# Patient Record
Sex: MALE | Race: WHITE | NOT HISPANIC OR LATINO | Employment: UNEMPLOYED | URBAN - METROPOLITAN AREA
[De-identification: names, ages, dates, MRNs, and addresses within clinical notes are randomized per-mention and may not be internally consistent; named-entity substitution may affect disease eponyms.]

---

## 2020-06-09 ENCOUNTER — DOCUMENTATION ONLY (OUTPATIENT)
Dept: FAMILY MEDICINE | Facility: CLINIC | Age: 35
End: 2020-06-09

## 2020-06-09 NOTE — PROGRESS NOTES
Pre-Visit Chart Review  For Appointment Scheduled on 6/10/2020    There are no preventive care reminders to display for this patient.

## 2020-06-10 ENCOUNTER — HOSPITAL ENCOUNTER (OUTPATIENT)
Dept: RADIOLOGY | Facility: CLINIC | Age: 35
Discharge: HOME | End: 2020-06-10
Attending: FAMILY MEDICINE
Payer: COMMERCIAL

## 2020-06-10 ENCOUNTER — TELEPHONE (OUTPATIENT)
Dept: FAMILY MEDICINE | Facility: CLINIC | Age: 35
End: 2020-06-10

## 2020-06-10 ENCOUNTER — OFFICE VISIT (OUTPATIENT)
Dept: FAMILY MEDICINE | Facility: CLINIC | Age: 35
End: 2020-06-10
Payer: COMMERCIAL

## 2020-06-10 VITALS
DIASTOLIC BLOOD PRESSURE: 64 MMHG | BODY MASS INDEX: 27.88 KG/M2 | HEART RATE: 83 BPM | SYSTOLIC BLOOD PRESSURE: 110 MMHG | WEIGHT: 167.31 LBS | OXYGEN SATURATION: 96 % | TEMPERATURE: 97 F | HEIGHT: 65 IN

## 2020-06-10 DIAGNOSIS — M54.50 CHRONIC MIDLINE LOW BACK PAIN WITHOUT SCIATICA: ICD-10-CM

## 2020-06-10 DIAGNOSIS — G89.29 CHRONIC MIDLINE LOW BACK PAIN WITHOUT SCIATICA: ICD-10-CM

## 2020-06-10 DIAGNOSIS — Z00.00 HEALTHCARE MAINTENANCE: Primary | ICD-10-CM

## 2020-06-10 PROCEDURE — 99999 PR PBB SHADOW E&M-EST. PATIENT-LVL IV: CPT | Mod: PBBFAC,,, | Performed by: FAMILY MEDICINE

## 2020-06-10 PROCEDURE — 99385 PREV VISIT NEW AGE 18-39: CPT | Mod: S$GLB,,, | Performed by: FAMILY MEDICINE

## 2020-06-10 PROCEDURE — 99999 PR PBB SHADOW E&M-EST. PATIENT-LVL IV: ICD-10-PCS | Mod: PBBFAC,,, | Performed by: FAMILY MEDICINE

## 2020-06-10 PROCEDURE — 72100 XR LUMBAR SPINE AP AND LATERAL: ICD-10-PCS | Mod: 26,,, | Performed by: RADIOLOGY

## 2020-06-10 PROCEDURE — 99385 PR PREVENTIVE VISIT,NEW,18-39: ICD-10-PCS | Mod: S$GLB,,, | Performed by: FAMILY MEDICINE

## 2020-06-10 PROCEDURE — 72100 X-RAY EXAM L-S SPINE 2/3 VWS: CPT | Mod: TC,FY,PO

## 2020-06-10 PROCEDURE — 72100 X-RAY EXAM L-S SPINE 2/3 VWS: CPT | Mod: 26,,, | Performed by: RADIOLOGY

## 2020-06-10 RX ORDER — PANTOPRAZOLE SODIUM 40 MG/1
40 TABLET, DELAYED RELEASE ORAL DAILY
Qty: 30 TABLET | Refills: 1 | Status: SHIPPED | OUTPATIENT
Start: 2020-06-10 | End: 2020-11-23

## 2020-06-10 RX ORDER — CYCLOBENZAPRINE HCL 5 MG
5 TABLET ORAL 3 TIMES DAILY PRN
Qty: 90 TABLET | Refills: 1 | Status: SHIPPED | OUTPATIENT
Start: 2020-06-10 | End: 2020-07-10

## 2020-06-10 NOTE — TELEPHONE ENCOUNTER
Called pharmacy. Medication is ready to , called patient let him know, and advise he needs to bring hi insurance card,. Understand and verbalized

## 2020-06-10 NOTE — PROGRESS NOTES
Subjective:   Patient ID: Hero Salmeron is a 34 y.o. male     Chief Complaint:Establish Care      Patient for checkup.  Patient doing well.  Patient with lower back pain been going on for past years.    Review of Systems   Constitutional: Negative for chills and fever.   HENT: Negative for sore throat and trouble swallowing.    Respiratory: Negative for cough and shortness of breath.    Cardiovascular: Negative for chest pain and leg swelling.   Gastrointestinal: Negative for abdominal distention and abdominal pain.   Genitourinary: Negative for dysuria and flank pain.   Musculoskeletal: Negative for arthralgias and back pain.   Skin: Negative for color change and pallor.   Neurological: Negative for weakness and headaches.   Psychiatric/Behavioral: Negative for agitation and confusion.     Past Medical History:   Diagnosis Date    Psoriasis      History reviewed. No pertinent surgical history.  Objective:     Vitals:    06/10/20 1030   BP: 110/64   Pulse: 83   Temp: 97.2 °F (36.2 °C)     Body mass index is 27.85 kg/m².  Physical Exam   Constitutional: He is oriented to person, place, and time. He appears well-developed and well-nourished.   HENT:   Head: Normocephalic and atraumatic.   Eyes: Conjunctivae are normal. No scleral icterus.   Neck: Normal range of motion. Neck supple.   Pulmonary/Chest: Effort normal. No respiratory distress.   Musculoskeletal: Normal range of motion. He exhibits no deformity.   Neurological: He is alert and oriented to person, place, and time.   Skin: No rash noted. No pallor.   Psychiatric: He has a normal mood and affect. His behavior is normal. Judgment and thought content normal.   Nursing note and vitals reviewed.    Assessment:     1. Healthcare maintenance    2. Chronic midline low back pain without sciatica      Plan:   Healthcare maintenance  -     Cancel: Comprehensive metabolic panel; Future; Expected date: 06/10/2020  -     Cancel: CBC auto differential; Future;  Expected date: 06/10/2020  -     Cancel: Lipid Panel; Future; Expected date: 06/10/2020    Chronic midline low back pain without sciatica  -     X-Ray Lumbar Spine AP And Lateral; Future; Expected date: 06/10/2020  -     Ambulatory referral/consult to [facility] Clinic; Future; Expected date: 06/17/2020  -     cyclobenzaprine (FLEXERIL) 5 MG tablet; Take 1 tablet (5 mg total) by mouth 3 (three) times daily as needed for Muscle spasms.  Dispense: 90 tablet; Refill: 1    Other orders  -     pantoprazole (PROTONIX) 40 MG tablet; Take 1 tablet (40 mg total) by mouth once daily.  Dispense: 30 tablet; Refill: 1          Amador Gonzalez MD  06/10/2020    Portions of this note have been dictated with YULIA Hargrove

## 2020-06-10 NOTE — TELEPHONE ENCOUNTER
----- Message from Garland Anderson sent at 6/10/2020 12:42 PM CDT -----  Type: Needs Medical Advice  Who Called:  self  Symptoms (please be specific):    How long has patient had these symptoms:   Pharmacy name and phone #:    Best Call Back Number: [phone]   Additional Information: Patient was seen today, advised a new rx would be called into the pharmacy.  [facility] pharmacy at   [address] has not received the rx.

## 2020-06-16 ENCOUNTER — OFFICE VISIT (OUTPATIENT)
Dept: SPINE | Facility: CLINIC | Age: 35
End: 2020-06-16
Payer: COMMERCIAL

## 2020-06-16 VITALS
HEIGHT: 65 IN | WEIGHT: 167.31 LBS | BODY MASS INDEX: 27.88 KG/M2 | DIASTOLIC BLOOD PRESSURE: 77 MMHG | SYSTOLIC BLOOD PRESSURE: 118 MMHG | HEART RATE: 86 BPM

## 2020-06-16 DIAGNOSIS — M54.50 CHRONIC MIDLINE LOW BACK PAIN WITHOUT SCIATICA: ICD-10-CM

## 2020-06-16 DIAGNOSIS — G89.29 CHRONIC MIDLINE LOW BACK PAIN WITHOUT SCIATICA: ICD-10-CM

## 2020-06-16 PROCEDURE — 99204 PR OFFICE/OUTPT VISIT, NEW, LEVL IV, 45-59 MIN: ICD-10-PCS | Mod: S$GLB,,, | Performed by: PHYSICAL MEDICINE & REHABILITATION

## 2020-06-16 PROCEDURE — 3008F PR BODY MASS INDEX (BMI) DOCUMENTED: ICD-10-PCS | Mod: S$GLB,,, | Performed by: PHYSICAL MEDICINE & REHABILITATION

## 2020-06-16 PROCEDURE — 3008F BODY MASS INDEX DOCD: CPT | Mod: S$GLB,,, | Performed by: PHYSICAL MEDICINE & REHABILITATION

## 2020-06-16 PROCEDURE — 99204 OFFICE O/P NEW MOD 45 MIN: CPT | Mod: S$GLB,,, | Performed by: PHYSICAL MEDICINE & REHABILITATION

## 2020-06-16 NOTE — PROGRESS NOTES
SUBJECTIVE:    Patient ID: Hero Salmeron is a 34 y.o. male.    Chief Complaint: Back Pain    This is a 34-year-old man who sees Dr. Gonzalez for his primary care.  Has no chronic major medical problems.  About 3 or 4 year history of midline low back pain at the lumbosacral junction.  Responded well to physical therapy a couple of years ago.  Says he had an MRI about 2 years ago that showed some degenerative changes.  I do not have the report or the disc.  Current complaint is centralized low back pain at the lumbosacral junction with no radicular leg pain or weakness.  Denies bowel or bladder dysfunction fever chills sweats or unexpected weight loss.  Using ibuprofen as needed for pain.  That helps some.  Saw Dr. Gonzalez on 06/10/2020 and was prescribed Flexeril which also helped some although he can take it during the day due to sedation.  His pain is worse with activity.  He says his usual pain episodes last for about a week or so but for about the past 2 and half months he has had persistent discomfort.  He feels like muscle spasms and that is back locks up.  Current pain level 1/10        Past Medical History:   Diagnosis Date    Psoriasis      Social History     Socioeconomic History    Marital status: Significant Other     Spouse name: Not on file    Number of children: 0    Years of education: Not on file    Highest education level: Not on file   Occupational History    Not on file   Social Needs    Financial resource strain: Not on file    Food insecurity     Worry: Not on file     Inability: Not on file    Transportation needs     Medical: Not on file     Non-medical: Not on file   Tobacco Use    Smoking status: Current Some Day Smoker     Types: Cigarettes    Smokeless tobacco: Never Used    Tobacco comment: social smoker   Substance and Sexual Activity    Alcohol use: Yes    Drug use: Never    Sexual activity: Yes     Partners: Female   Lifestyle    Physical activity     Days per week:  "Not on file     Minutes per session: Not on file    Stress: Only a little   Relationships    Social connections     Talks on phone: Not on file     Gets together: Not on file     Attends [employer] service: Not on file     Active member of club or organization: Not on file     Attends meetings of clubs or organizations: Not on file     Relationship status: Not on file   Other Topics Concern    Not on file   Social History Narrative    Not on file     History reviewed. No pertinent surgical history.  Family History   Problem Relation Age of Onset    No Known Problems Mother     Hypertension Father     No Known Problems Brother      Vitals:    06/16/20 0959   BP: 118/77   Pulse: 86   Weight: 75.9 kg (167 lb 5.3 oz)   Height: 5' 5" (1.651 m)       Review of Systems   Constitutional: Negative for chills, diaphoresis, fatigue, fever and unexpected weight change.   HENT: Negative for trouble swallowing.    Eyes: Negative for visual disturbance.   Respiratory: Negative for shortness of breath.    Cardiovascular: Negative for chest pain.   Gastrointestinal: Negative for abdominal pain, constipation, diarrhea, nausea and vomiting.   Genitourinary: Negative for difficulty urinating.   Musculoskeletal: Negative for arthralgias, back pain, gait problem, joint swelling, myalgias, neck pain and neck stiffness.   Neurological: Negative for dizziness, speech difficulty, weakness, light-headedness, numbness and headaches.          Objective:      Physical Exam  Neurological:      Mental Status: He is alert and oriented to person, place, and time.      Comments: He is awake and in no acute distress  No point tenderness external lesions or palpable masses about the lumbar spine  Forward flexion and extension are normal although he has some pain on transitioning from forward flexion to extension  He can heel and toe walk normally  Deep tendon reflexes +1 at both knees and both ankles  Strength is normal in both lower " extremities  Straight leg raising negative bilaterally  KAMARI testing negative bilaterally             Assessment:       1. Chronic midline low back pain without sciatica           Plan:     he has a nonfocal examination from a neurological standpoint and no historical red flags.  I am going to start him in outpatient physical therapy.  Continue the Flexeril and ibuprofen.  Follow-up in 6 weeks.  Consider MRI and subsequent epidural steroid injections.  He should bring the report or prior MRI disc if possible for me to review      Chronic midline low back pain without sciatica  -     Ambulatory referral/consult to [facility]  -     Ambulatory referral/consult to Physical/Occupational Therapy; Future; Expected date: 06/23/2020

## 2020-06-16 NOTE — LETTER
June 16, 2020      Amador Gonzalez MD  [address]  [place] [address]           [place] [facility]  [address] [place] [address] [address]  [place] [address]  Phone: [phone]  Fax: [phone]          Patient: Hero Salmeron   MR Number: [id number]   YOB: 1985   Date of Visit: 6/16/2020       Dear Dr. Amador Gonzalez:    Thank you for referring Hero Salmeron to me for evaluation. Attached you will find relevant portions of my assessment and plan of care.    If you have questions, please do not hesitate to call me. I look forward to following Hero Salmeron along with you.    Sincerely,    Devon Amor MD    Enclosure  CC:  No Recipients    If you would like to receive this communication electronically, please contact [email or web address] or [phone] to request more information on [facility] access.    For providers and/or their staff who would like to refer a patient to [facility], please contact us through our one-stop-shop provider referral line, [facility], at [phone].    If you feel you have received this communication in error or would no longer like to receive these types of communications, please e-mail [email or web address]

## 2020-06-17 ENCOUNTER — TELEPHONE (OUTPATIENT)
Dept: ADMINISTRATIVE | Facility: OTHER | Age: 35
End: 2020-06-17

## 2020-06-23 ENCOUNTER — CLINICAL SUPPORT (OUTPATIENT)
Dept: REHABILITATION | Facility: HOSPITAL | Age: 35
End: 2020-06-23
Payer: COMMERCIAL

## 2020-06-23 DIAGNOSIS — M54.50 CHRONIC MIDLINE LOW BACK PAIN WITHOUT SCIATICA: ICD-10-CM

## 2020-06-23 DIAGNOSIS — R53.81 DEBILITY: ICD-10-CM

## 2020-06-23 DIAGNOSIS — G89.29 CHRONIC MIDLINE LOW BACK PAIN WITHOUT SCIATICA: ICD-10-CM

## 2020-06-23 PROCEDURE — 97012 MECHANICAL TRACTION THERAPY: CPT | Mod: PN | Performed by: PHYSICAL THERAPIST

## 2020-06-23 PROCEDURE — 97530 THERAPEUTIC ACTIVITIES: CPT | Mod: PN | Performed by: PHYSICAL THERAPIST

## 2020-06-23 PROCEDURE — 97161 PT EVAL LOW COMPLEX 20 MIN: CPT | Mod: PN | Performed by: PHYSICAL THERAPIST

## 2020-06-23 NOTE — PLAN OF CARE
[facility]  Physical Therapy Initial Evaluation    Name: Hero Salmeron  Clinic Number: [id number]    Therapy Diagnosis:   Encounter Diagnoses   Name Primary?    Chronic midline low back pain without sciatica     Debility      Physician: Devon Amor MD    Physician Orders: PT Eval and Treat   Medical Diagnosis from Referral: Chronic midline low back pain without sciatica  Evaluation Date: 6/23/2020  Authorization Period Expiration: 12/31/2020  Plan of Care Expiration: 8/14/2020  Visit # / Visits authorized: 1/ 1    Time In: 1625  Time Out: 1715  Total Billable Time: 50 minutes    Precautions: Standard    Subjective   Date of onset: 4/2020  History of current condition - Hero reports: a chronic h/o intermittent lumbar pain over the past several years. This recent episode began in April of 2020 when the patient attempt to move a stand up desk from his office to his home. He reports pain with prolonged standing, lifting, pushing and pulling.. He c/o bilateral lumbar pain but denies symptoms into the LEs.      Medical History:   Past Medical History:   Diagnosis Date    Psoriasis        Surgical History:   Hero Salmeron  has no past surgical history on file.    Medications:   Hero has a current medication list which includes the following prescription(s): cyclobenzaprine and pantoprazole.    Allergies:   Review of patient's allergies indicates:  No Known Allergies     Imaging, MRI studies: epic    Prior Therapy: PT  Social History: single lives with an adult   Occupation:   Prior Level of Function: Independent  Current Level of Function: Decreased ability to perform ADL and limited tolerance to standing and walking.    Pain:  Current 1/10, worst 10/10, best 0/10   Location: bilateral lumbar    Description: Sharp  Aggravating Factors: Standing and Walking  Easing Factors: rest    Pts goals: Return to PLOF      Objective     Posture:  Decreased lumbar lordosis and forward head in standing  Palpation: Moderate point tenderness noted with palpation of the lumbar paraspinals  Sensation: Intact    Lumbar AROM: ROM-   Response to repeated movements   Flexion 26 degrees - Pain at end range, no effect as a result   Extension 6 degrees - Pain at end range, no effect as a result   Right side bending 6 degrees   Left side bending 4 degrees     L/E MMT Left Right   Hip Flexion 5/5 5/5   Hip Extension 5/5 5/5   Hip Abduction 5/5 5/5   Hip Adduction 5/5 5/5   Hip IR 5/5 5/5   Hip ER 5/5 5/5   Knee Flexion 5/5 5/5   Knee Extension 5/5 5/5   Ankle DF 5/5 5/5   Ankle PF 5/5 5/5   Ankle Inversion 5/5 5/5   Ankle Eversion 5/5 5/5   Abdominals 4/5 4/5       Flexibility Left Right   Hamstrings 32 degrees 38 degrees   Achilles 0 degrees 2 degrees       Special Tests Left Right   SLR negative negative   KAMARI negative negative   Piriformis negative negative   Slump negative negative       Gait Without AD   Analysis The patient ambulates with bilateral LE externally rotated in stance phase       Other:     CMS Impairment/Limitation/Restriction for FOTO  Survey    Therapist reviewed FOTO scores for Hero Salmeron on 6/23/2020.   FOTO documents entered into [employer] - see Media section.    Limitation Score: 52%  Category: Mobility    Current : CK = at least 40% but < 60% impaired, limited or restricted  Goal: CJ = at least 20% but < 40% impaired, limited or restricted           TREATMENT   Treatment Time In: 1645  Treatment Time Out: 1715  Total Treatment time separate from Evaluation: 30 minutes    Hero received therapeutic exercises to develop flexibility for 15 minutes including:    Supine HSS 10 x 15 sec B  Supine piriformis stretch 3 x 30 sec B  Long sitting GSS 3 x 30 sec B    Patient received intermittent lumbar traction x 15 min at 60# max/ 25# min, 30 sec max/ 10 sec min.    Home Exercises and Patient Education Provided    Education provided:   - Posture  education, HEP    Written Home Exercises Provided: yes.  Exercises were reviewed and Hero was able to demonstrate them prior to the end of the session.  Hero demonstrated good  understanding of the education provided.     See EMR under Patient Instructions for exercises provided 6/23/2020.    Assessment   Hero is a 34 y.o. male referred to outpatient Physical Therapy with a medical diagnosis of Chronic midline low back pain without sciatica. Pt presents with     1. Lumbar pain  2. Decreased LE flexibility  3. Decreased abdominal strength  4. Decreased tolerance to prolonged standing and walking    Pt prognosis is Good.   Pt will benefit from skilled outpatient Physical Therapy to address the deficits stated above and in the chart below, provide pt/family education, and to maximize pt's level of independence.     Plan of care discussed with patient: Yes  Pt's spiritual, cultural and educational needs considered and patient is agreeable to the plan of care and goals as stated below:     Anticipated Barriers for therapy: none    Medical Necessity is demonstrated by the following  History  Co-morbidities and personal factors that may impact the plan of care Co-morbidities:   none    Personal Factors:   no deficits     low   Examination  Body Structures and Functions, activity limitations and participation restrictions that may impact the plan of care Body Regions:   back    Body Systems:    ROM  strength    Participation Restrictions:   none    Activity limitations:   Learning and applying knowledge  no deficits    General Tasks and Commands  no deficits    Communication  no deficits    Mobility  no deficits    Self care  no deficits    Domestic Life  no deficits    Interactions/Relationships  no deficits    Life Areas  no deficits    Community and Social Life  no deficits         low   Clinical Presentation stable and uncomplicated low   Decision Making/ Complexity Score: low     Goals:  Short Term Goals (STG) # weeks  Goal Review Date Reviewed Date Met   1. The patient will begin a written HEP 1 Initial 6/23/2020    2. Increase hamstring flexibility to 55 3 Initial 6/23/2020    3. Decrease soft tissue tenderness to mild 3 Initial 6/23/2020      Long Term Goals (LTG) # weeks Goal Review Date Reviewed Date Met   1. The patient will be independent with his HEP for maintenance 6 Initial 6/23/2020    2. Increase hamstring flexibility to 70* 6 Initial 6/23/2020    3. Decrease FOTO score to 31% 6 Initial 6/23/2020    4. The patient will tolerate standing for 30 minutes without onset of symptoms 6 Initial 6/23/2020        Plan   Plan of care Certification: 6/23/2020 to 8/14/2020.    Outpatient Physical Therapy 2 times weekly for 6 weeks to include the following interventions: Cervical/Lumbar Traction, Electrical Stimulation IMS, Manual Therapy, Dry Needling, Neuromuscular Re-ed, Patient Education, Therapeutic Activites and Therapeutic Exercise.     Thank you for this referral,      Floyd Baez, PT

## 2020-06-30 ENCOUNTER — CLINICAL SUPPORT (OUTPATIENT)
Dept: REHABILITATION | Facility: HOSPITAL | Age: 35
End: 2020-06-30
Payer: COMMERCIAL

## 2020-06-30 DIAGNOSIS — M54.50 CHRONIC MIDLINE LOW BACK PAIN WITHOUT SCIATICA: ICD-10-CM

## 2020-06-30 DIAGNOSIS — G89.29 CHRONIC MIDLINE LOW BACK PAIN WITHOUT SCIATICA: ICD-10-CM

## 2020-06-30 DIAGNOSIS — R53.81 DEBILITY: ICD-10-CM

## 2020-06-30 PROCEDURE — 97012 MECHANICAL TRACTION THERAPY: CPT | Mod: PN,CQ

## 2020-06-30 PROCEDURE — 97110 THERAPEUTIC EXERCISES: CPT | Mod: PN,CQ

## 2020-06-30 NOTE — PROGRESS NOTES
Physical Therapy Daily Treatment Note     Name: Hero Quiroz [place]  Clinic Number: [id number]    Therapy Diagnosis:   Encounter Diagnoses   Name Primary?    Chronic midline low back pain without sciatica     Debility      Physician: Devon Amor MD    Visit Date: 6/30/2020  Physician Orders: PT Eval and Treat   Medical Diagnosis from Referral: Chronic midline low back pain without sciatica  Evaluation Date: 6/23/2020  Authorization Period Expiration: 12/31/2020  Plan of Care Expiration: 8/14/2020  Visit # / Visits authorized: 1/20 (2 total)      Time In: 400  Time Out: 449  Total Billable Time: 45 minutes    Precautions: Standard    Subjective     Pt reports: no complaints.  He was compliant with home exercise program.  Response to previous treatment: soreness for a couple of days  Functional change:     Pain: 0/10  Location: bilateral LB region      Objective     Hero received therapeutic exercises to develop strength, flexibility, posture and core stabilization for 30 minutes including:    LTR x 20 B  SKC x 30 B  Supine PB crunch x 30  Supine PB obliques x 30 B  Hip abd GTB x 30  Hip add x 30  Long sitting gastroc stretch 3 x 30 sec B    Seated hamstring stretch 3 x 30 sec B  Seated piriformis stretch 3 x 30 sec B    Lumbar traction: 60#/25#, 30/10 hold/release x 10 minutes    Home Exercises Provided and Patient Education Provided     Education provided:   - cont HEP    Written Home Exercises Provided: Patient instructed to cont prior HEP.  Exercises were reviewed and Hero was able to demonstrate them prior to the end of the session.  Hero demonstrated good  understanding of the education provided.     See EMR under Patient Instructions for exercises provided prior visit.    Assessment     Minimal L LB discomfort with knee extension from flexion to chest.  Good overall tolerance for activity.  Tight hamstrings B.    Hero is progressing well towards his goals.   Pt prognosis is Good.     Pt will  continue to benefit from skilled outpatient physical therapy to address the deficits listed in the problem list box on initial evaluation, provide pt/family education and to maximize pt's level of independence in the home and community environment.     Pt's spiritual, cultural and educational needs considered and pt agreeable to plan of care and goals.    Anticipated barriers to physical therapy: none    Goals:  Short Term Goals (STG) # weeks Goal Review Date Reviewed Date Met   1. The patient will begin a written HEP 1 met 6/30/2020 6/30/2020    2. Increase hamstring flexibility to 55 3 progressing 6/30/2020       3. Decrease soft tissue tenderness to mild 3 progressing 6/30/2020          Long Term Goals (LTG) # weeks Goal Review Date Reviewed Date Met   1. The patient will be independent with his HEP for maintenance 6 progressing 6/30/2020       2. Increase hamstring flexibility to 70* 6 progressing 6/30/2020       3. Decrease FOTO score to 31% 6 progressing 6/30/2020       4. The patient will tolerate standing for 30 minutes without onset of symptoms 6 progressing 6/30/2020             Plan     Cont per POC, strengthening, core    Ashtyn Tang, PTA

## 2020-07-07 ENCOUNTER — CLINICAL SUPPORT (OUTPATIENT)
Dept: REHABILITATION | Facility: HOSPITAL | Age: 35
End: 2020-07-07
Payer: COMMERCIAL

## 2020-07-07 DIAGNOSIS — R53.81 DEBILITY: ICD-10-CM

## 2020-07-07 DIAGNOSIS — G89.29 CHRONIC MIDLINE LOW BACK PAIN WITHOUT SCIATICA: ICD-10-CM

## 2020-07-07 DIAGNOSIS — M54.50 CHRONIC MIDLINE LOW BACK PAIN WITHOUT SCIATICA: ICD-10-CM

## 2020-07-07 PROCEDURE — 97012 MECHANICAL TRACTION THERAPY: CPT | Mod: PN,CQ

## 2020-07-07 PROCEDURE — 97110 THERAPEUTIC EXERCISES: CPT | Mod: PN,CQ

## 2020-07-07 NOTE — PROGRESS NOTES
Physical Therapy Daily Treatment Note     Name: Hero Quiroz [place]  Clinic Number: [id number]    Therapy Diagnosis:   Encounter Diagnoses   Name Primary?    Chronic midline low back pain without sciatica     Debility      Physician: Devon Amor MD    Visit Date: 7/7/2020  Physician Orders: PT Eval and Treat   Medical Diagnosis from Referral: Chronic midline low back pain without sciatica  Evaluation Date: 6/23/2020  Authorization Period Expiration: 12/31/2020  Plan of Care Expiration: 8/14/2020  Visit # / Visits authorized: 2/20 (3 total)      Time In: 528p  Time Out: 615p  Total Billable Time: 47 minutes    Precautions: Standard    Subjective     Pt reports: doing well  He was compliant with home exercise program.  Response to previous treatment: sore  Functional change:     Pain: 0/10  Location: bilateral LB region      Objective     Hero received therapeutic exercises to develop strength, flexibility, posture and core stabilization for 32 minutes including:    LTR x 20 B  SKC x 30 B HELD  Supine PB crunch x 30  Supine PB obliques x 30 B  Hip abd GTB x 30  Bridges w/GTB x 30  Hip add x 30  Long sitting gastroc stretch 3 x 30 sec B  HELD    Supine hamstring stretch 3 x 30 sec B  Supine piriformis stretch 3 x 30 sec B    Lumbar traction: 60#/25#, 30/10 hold/release x 15 minutes    Home Exercises Provided and Patient Education Provided     Education provided:   - cont HEP    Written Home Exercises Provided: Patient instructed to cont prior HEP.  Exercises were reviewed and Hero was able to demonstrate them prior to the end of the session.  Hero demonstrated good  understanding of the education provided.     See EMR under Patient Instructions for exercises provided prior visit.    Assessment     Minimal LB discomfort at conclusion of traction, which resolved at completion of therapy.  Good tolerance for exercises.    Hero is progressing well towards his goals.   Pt prognosis is Good.     Pt will  continue to benefit from skilled outpatient physical therapy to address the deficits listed in the problem list box on initial evaluation, provide pt/family education and to maximize pt's level of independence in the home and community environment.     Pt's spiritual, cultural and educational needs considered and pt agreeable to plan of care and goals.    Anticipated barriers to physical therapy: none    Goals:  Short Term Goals (STG) # weeks Goal Review Date Reviewed Date Met   1. The patient will begin a written HEP 1 met 6/30/2020 6/30/2020    2. Increase hamstring flexibility to 55 3 progressing 7/7/2020       3. Decrease soft tissue tenderness to mild 3 progressing 7/7/2020          Long Term Goals (LTG) # weeks Goal Review Date Reviewed Date Met   1. The patient will be independent with his HEP for maintenance 6 progressing 7/7/2020       2. Increase hamstring flexibility to 70* 6 progressing 7/7/2020       3. Decrease FOTO score to 31% 6 progressing 7/7/2020       4. The patient will tolerate standing for 30 minutes without onset of symptoms 6 progressing 7/7/2020             Plan     Cont per POC, strengthening, core    Ashtyn Tang, PTA

## 2020-07-09 ENCOUNTER — CLINICAL SUPPORT (OUTPATIENT)
Dept: REHABILITATION | Facility: HOSPITAL | Age: 35
End: 2020-07-09
Payer: COMMERCIAL

## 2020-07-09 ENCOUNTER — DOCUMENTATION ONLY (OUTPATIENT)
Dept: REHABILITATION | Facility: HOSPITAL | Age: 35
End: 2020-07-09

## 2020-07-09 DIAGNOSIS — R53.81 DEBILITY: ICD-10-CM

## 2020-07-09 DIAGNOSIS — M54.50 CHRONIC MIDLINE LOW BACK PAIN WITHOUT SCIATICA: ICD-10-CM

## 2020-07-09 DIAGNOSIS — G89.29 CHRONIC MIDLINE LOW BACK PAIN WITHOUT SCIATICA: ICD-10-CM

## 2020-07-09 PROCEDURE — 97110 THERAPEUTIC EXERCISES: CPT | Mod: PN,CQ

## 2020-07-09 PROCEDURE — 97012 MECHANICAL TRACTION THERAPY: CPT | Mod: PN,CQ

## 2020-07-09 NOTE — PROGRESS NOTES
30 day visit PT-PTA face-face discussion with ASHLEY Baez re: patient status, POC, and plan for progression done 7/9/20.  Ashtyn Tang, PTA

## 2020-07-09 NOTE — PROGRESS NOTES
"  Physical Therapy Daily Treatment Note     Name: Hero Quiroz [place]  Clinic Number: [id number]    Therapy Diagnosis:   Encounter Diagnoses   Name Primary?    Chronic midline low back pain without sciatica     Debility      Physician: Devon Amor MD    Visit Date: 7/9/2020  Physician Orders: PT Eval and Treat   Medical Diagnosis from Referral: Chronic midline low back pain without sciatica  Evaluation Date: 6/23/2020  Authorization Period Expiration: 12/31/2020  Plan of Care Expiration: 8/14/2020  Visit # / Visits authorized: 3/20 (4 total)      Time In: 526p  Time Out: 617p  Total Billable Time: 51 minutes    Precautions: Standard    Subjective     Pt reports: doing well today  He was compliant with home exercise program.  Response to previous treatment: no complaints   Functional change: no change    Pain: 0/10  Location: bilateral LB region      Objective     Hero received therapeutic exercises to develop strength, flexibility, posture and core stabilization for 36 minutes including:    LTR x 20 B  SKC x 30 B   Supine PB crunch x 30  Supine PB obliques x 30 B  Hip abd GTB x 30  Bridges w/GTB x 30  Hip add x 30  Slant board gastroc stretch 3 x 30 sec B      Seated hamstring stretch 3 x 30 sec B-supine caused LLE to go "numb"  Supine piriformis stretch 3 x 30 sec B    PB: shoulder flexion 2# x 30 B    Lumbar traction: 60#/25#, 30/10 hold/release x 15 minutes    Home Exercises Provided and Patient Education Provided     Education provided:   - cont HEP    Written Home Exercises Provided: Patient instructed to cont prior HEP.  Exercises were reviewed and Hero was able to demonstrate them prior to the end of the session.  Hero demonstrated good  understanding of the education provided.     See EMR under Patient Instructions for exercises provided prior visit.    Assessment     Good tolerance for additional exercises.  Soreness with some exercises.    Hero is progressing well towards his goals.   Pt " prognosis is Good.     Pt will continue to benefit from skilled outpatient physical therapy to address the deficits listed in the problem list box on initial evaluation, provide pt/family education and to maximize pt's level of independence in the home and community environment.     Pt's spiritual, cultural and educational needs considered and pt agreeable to plan of care and goals.    Anticipated barriers to physical therapy: none    Goals:  Short Term Goals (STG) # weeks Goal Review Date Reviewed Date Met   1. The patient will begin a written HEP 1 met 6/30/2020 6/30/2020    2. Increase hamstring flexibility to 55 3 progressing 7/9/2020       3. Decrease soft tissue tenderness to mild 3 progressing 7/9/2020          Long Term Goals (LTG) # weeks Goal Review Date Reviewed Date Met   1. The patient will be independent with his HEP for maintenance 6 progressing 7/9/2020       2. Increase hamstring flexibility to 70* 6 progressing 7/9/2020       3. Decrease FOTO score to 31% 6 progressing 7/9/2020       4. The patient will tolerate standing for 30 minutes without onset of symptoms 6 progressing 7/9/2020             Plan     Cont per POC, strengthening, core    I certify that I was present in the room directing the student in service delivery and guiding them using my skilled judgment. As the co-signing therapist I have reviewed the students documentation and am responsible for the treatment, assessment, and plan.     JERSEY Bazan, PTA

## 2020-07-14 ENCOUNTER — CLINICAL SUPPORT (OUTPATIENT)
Dept: REHABILITATION | Facility: HOSPITAL | Age: 35
End: 2020-07-14
Payer: COMMERCIAL

## 2020-07-14 DIAGNOSIS — G89.29 CHRONIC MIDLINE LOW BACK PAIN WITHOUT SCIATICA: ICD-10-CM

## 2020-07-14 DIAGNOSIS — M54.50 CHRONIC MIDLINE LOW BACK PAIN WITHOUT SCIATICA: ICD-10-CM

## 2020-07-14 DIAGNOSIS — R53.81 DEBILITY: ICD-10-CM

## 2020-07-14 PROCEDURE — 97110 THERAPEUTIC EXERCISES: CPT | Mod: PN,CQ

## 2020-07-14 PROCEDURE — 97012 MECHANICAL TRACTION THERAPY: CPT | Mod: PN,CQ

## 2020-07-14 NOTE — PROGRESS NOTES
"  Physical Therapy Daily Treatment Note     Name: Hero Quiroz [place]  Clinic Number: [id number]    Therapy Diagnosis:   Encounter Diagnoses   Name Primary?    Chronic midline low back pain without sciatica     Debility      Physician: Devon Amor MD    Visit Date: 7/14/2020  Physician Orders: PT Eval and Treat   Medical Diagnosis from Referral: Chronic midline low back pain without sciatica  Evaluation Date: 6/23/2020  Authorization Period Expiration: 12/31/2020  Plan of Care Expiration: 8/14/2020  Visit # / Visits authorized: 4/20 (5 total)      Time In: 527 p  Time Out: 614 p  Total Billable Time: 47 minutes    Precautions: Standard    Subjective     Pt reports: he has been sitting around the last couple of days, no pain, but moved a few days ago and had a minimal increase in LBP  He was compliant with home exercise program.  Response to previous treatment: no complaints   Functional change: no change    Pain: 0/10  Location: bilateral LB region      Objective     Hero received therapeutic exercises to develop strength, flexibility, posture and core stabilization for 32 minutes including:    LTR with PB x 30 B  SKC x 30 B   Supine PB crunch x 30  Supine PB obliques x 30 B  Hip abd GTB x 30  Bridges w/GTB x 30  Hip add x 30  +Dying bug x 20 B  Slant board gastroc stretch 3 x 30 sec B      Seated hamstring stretch 3 x 30 sec B-supine caused LLE to go "numb"  Supine piriformis stretch 3 x 30 sec B    PB: shoulder flexion, abd 2# x 20 each B    Lumbar traction: 60#/25#, 30/10 hold/release x 15 minutes    Home Exercises Provided and Patient Education Provided     Education provided:   - cont HEP    Written Home Exercises Provided: Patient instructed to cont prior HEP.  Exercises were reviewed and Hero was able to demonstrate them prior to the end of the session.  Hero demonstrated good  understanding of the education provided.     See EMR under Patient Instructions for exercises provided prior " visit.    Assessment     Overall strength is improving.  Tight hamstrings B remain, but improving slowly.    Hero is progressing well towards his goals.   Pt prognosis is Good.     Pt will continue to benefit from skilled outpatient physical therapy to address the deficits listed in the problem list box on initial evaluation, provide pt/family education and to maximize pt's level of independence in the home and community environment.     Pt's spiritual, cultural and educational needs considered and pt agreeable to plan of care and goals.    Anticipated barriers to physical therapy: none    Goals:  Short Term Goals (STG) # weeks Goal Review Date Reviewed Date Met   1. The patient will begin a written HEP 1 met 6/30/2020 6/30/2020    2. Increase hamstring flexibility to 55 3 progressing 7/14/2020       3. Decrease soft tissue tenderness to mild 3 progressing 7/14/2020          Long Term Goals (LTG) # weeks Goal Review Date Reviewed Date Met   1. The patient will be independent with his HEP for maintenance 6 progressing 7/14/2020       2. Increase hamstring flexibility to 70* 6 progressing 7/14/2020       3. Decrease FOTO score to 31% 6 progressing 7/14/2020       4. The patient will tolerate standing for 30 minutes without onset of symptoms 6 progressing 7/14/2020             Plan     Cont per POC, strengthening, core    I certify that I was present in the room directing the student in service delivery and guiding them using my skilled judgment. As the co-signing therapist I have reviewed the students documentation and am responsible for the treatment, assessment, and plan.     Ewa Preston, JERSEY Tang, PTA

## 2020-07-16 ENCOUNTER — CLINICAL SUPPORT (OUTPATIENT)
Dept: REHABILITATION | Facility: HOSPITAL | Age: 35
End: 2020-07-16
Payer: COMMERCIAL

## 2020-07-16 DIAGNOSIS — M54.50 CHRONIC MIDLINE LOW BACK PAIN WITHOUT SCIATICA: ICD-10-CM

## 2020-07-16 DIAGNOSIS — G89.29 CHRONIC MIDLINE LOW BACK PAIN WITHOUT SCIATICA: ICD-10-CM

## 2020-07-16 DIAGNOSIS — R53.81 DEBILITY: ICD-10-CM

## 2020-07-16 PROCEDURE — 97110 THERAPEUTIC EXERCISES: CPT | Mod: PN | Performed by: PHYSICAL THERAPIST

## 2020-07-16 PROCEDURE — 97012 MECHANICAL TRACTION THERAPY: CPT | Mod: PN | Performed by: PHYSICAL THERAPIST

## 2020-07-16 NOTE — PROGRESS NOTES
Physical Therapy Daily Treatment Note     Name: Hero Quiroz [place]  Clinic Number: [id number]    Therapy Diagnosis:   Encounter Diagnoses   Name Primary?    Chronic midline low back pain without sciatica     Debility      Physician: Devon Amor MD    Visit Date: 7/16/2020  Physician Orders: PT Eval and Treat   Medical Diagnosis from Referral: Chronic midline low back pain without sciatica  Evaluation Date: 6/23/2020  Authorization Period Expiration: 12/31/2020  Plan of Care Expiration: 8/14/2020  Visit # / Visits authorized: 6/20   (POC 7/12)      Time In: 1630  Time Out: 1715  Total Billable Time: 45 minutes    Precautions: Standard    Subjective      Pt reports: decreased back pain today.   He was compliant with home exercise program.  Response to previous treatment: no complaints   Functional change: no change    Pain: 0/10  Location: bilateral LB region      Objective     Hero received therapeutic exercises to develop strength, flexibility, posture and core stabilization for 32 minutes including:    LTR with PB x 30 B  SKC x 30 B   Supine PB crunch x 30  Supine PB obliques x 30 B  Hip abd GTB x 30  Bridges w/GTB x 30  Hip add x 30  +Dying bug x 20 B  Slant board gastroc stretch 3 x 30 sec B NP     Seated hamstring stretch 3 x 30 sec B  Supine piriformis stretch 3 x 30 sec B    PB: shoulder flexion, abd 2# x 20 each B    Lumbar traction: 60#/25#, 30/10 hold/release x 15 minutes    Home Exercises Provided and Patient Education Provided     Education provided:   - cont HEP    Written Home Exercises Provided: Patient instructed to cont prior HEP.  Exercises were reviewed and Hero was able to demonstrate them prior to the end of the session.  Hero demonstrated good  understanding of the education provided.     See EMR under Patient Instructions for exercises provided prior visit.    Assessment     Continue with physical therapy as per plan of care    Hero is progressing well towards his goals.   Pt  prognosis is Good.     Pt will continue to benefit from skilled outpatient physical therapy to address the deficits listed in the problem list box on initial evaluation, provide pt/family education and to maximize pt's level of independence in the home and community environment.     Pt's spiritual, cultural and educational needs considered and pt agreeable to plan of care and goals.    Anticipated barriers to physical therapy: none    Goals:  Short Term Goals (STG) # weeks Goal Review Date Reviewed Date Met   1. The patient will begin a written HEP 1 met 6/30/2020 6/30/2020    2. Increase hamstring flexibility to 55 3 progressing 7/16/2020       3. Decrease soft tissue tenderness to mild 3 progressing 7/16/2020          Long Term Goals (LTG) # weeks Goal Review Date Reviewed Date Met   1. The patient will be independent with his HEP for maintenance 6 progressing 7/16/2020       2. Increase hamstring flexibility to 70* 6 progressing 7/16/2020       3. Decrease FOTO score to 31% 6 progressing 7/16/2020       4. The patient will tolerate standing for 30 minutes without onset of symptoms 6 progressing 7/16/2020             Plan     Continue with physical therapy as per plan of care      Floyd Baez, PT

## 2020-07-20 ENCOUNTER — CLINICAL SUPPORT (OUTPATIENT)
Dept: REHABILITATION | Facility: HOSPITAL | Age: 35
End: 2020-07-20
Payer: COMMERCIAL

## 2020-07-20 DIAGNOSIS — R53.81 DEBILITY: ICD-10-CM

## 2020-07-20 DIAGNOSIS — M54.50 CHRONIC MIDLINE LOW BACK PAIN WITHOUT SCIATICA: ICD-10-CM

## 2020-07-20 DIAGNOSIS — G89.29 CHRONIC MIDLINE LOW BACK PAIN WITHOUT SCIATICA: ICD-10-CM

## 2020-07-20 PROCEDURE — 97110 THERAPEUTIC EXERCISES: CPT | Mod: PN,CQ

## 2020-07-20 PROCEDURE — 97012 MECHANICAL TRACTION THERAPY: CPT | Mod: PN,CQ

## 2020-07-20 NOTE — PROGRESS NOTES
"  Physical Therapy Daily Treatment Note     Name: Hero Quiroz [place]  Clinic Number: [id number]    Therapy Diagnosis:   Encounter Diagnoses   Name Primary?    Chronic midline low back pain without sciatica     Debility      Physician: Devon Amor MD    Visit Date: 7/20/2020  Physician Orders: PT Eval and Treat   Medical Diagnosis from Referral: Chronic midline low back pain without sciatica  Evaluation Date: 6/23/2020  Authorization Period Expiration: 12/31/2020  Plan of Care Expiration: 8/14/2020  Visit # / Visits authorized: 6/20 (7 total)      Time In: 529 p  Time Out: 618 p  Total Billable Time: 50 minutes    Precautions: Standard    Subjective     Pt reports: he does not feel therapy is helping, "If I move a certain way, the pain is still there.  There is no change in that."  He was compliant with home exercise program.  Response to previous treatment: no complaints   Functional change: no change    Pain: 0/10  Location: bilateral LB region      Objective     Hero received therapeutic exercises to develop strength, flexibility, posture and core stabilization for 35 minutes including:    LTR with PB x 30 B  SKC x 30 B   Supine PB crunch x 30  Supine PB obliques x 30 B  Hip abd GTB x 30  Bridges w/GTB x 30  Hip add x 30  Dying bug x 20 B  Slant board gastroc stretch 3 x 30 sec B      Seated hamstring stretch 3 x 30 sec B  Supine piriformis stretch 3 x 30 sec B    PB: shoulder flexion, abd 3# x 20 each B    Lumbar traction: 60#/25#, 30/10 hold/release x 15 minutes    Home Exercises Provided and Patient Education Provided     Education provided:   - cont HEP    Written Home Exercises Provided: Patient instructed to cont prior HEP.  Exercises were reviewed and Hero was able to demonstrate them prior to the end of the session.  Hero demonstrated good  understanding of the education provided.     See EMR under Patient Instructions for exercises provided prior visit.    Assessment     Tight hamstrings B " possibly contribute to LBP.  Good overall tolerance for activity.    Hero is progressing well towards his goals.   Pt prognosis is Good.     Pt will continue to benefit from skilled outpatient physical therapy to address the deficits listed in the problem list box on initial evaluation, provide pt/family education and to maximize pt's level of independence in the home and community environment.     Pt's spiritual, cultural and educational needs considered and pt agreeable to plan of care and goals.    Anticipated barriers to physical therapy: none    Goals:  Short Term Goals (STG) # weeks Goal Review Date Reviewed Date Met   1. The patient will begin a written HEP 1 met 6/30/2020 6/30/2020    2. Increase hamstring flexibility to 55 3 progressing 7/20/2020       3. Decrease soft tissue tenderness to mild 3 progressing 7/20/2020          Long Term Goals (LTG) # weeks Goal Review Date Reviewed Date Met   1. The patient will be independent with his HEP for maintenance 6 progressing 7/20/2020       2. Increase hamstring flexibility to 70* 6 progressing 7/20/2020       3. Decrease FOTO score to 31% 6 progressing 7/20/2020       4. The patient will tolerate standing for 30 minutes without onset of symptoms 6 progressing 7/20/2020             Plan     Cont per POC, strengthening, core    I certify that I was present in the room directing the student in service delivery and guiding them using my skilled judgment. As the co-signing therapist I have reviewed the students documentation and am responsible for the treatment, assessment, and plan.     Ewa Preston, JERSEY Tang, PTA

## 2020-07-21 ENCOUNTER — CLINICAL SUPPORT (OUTPATIENT)
Dept: REHABILITATION | Facility: HOSPITAL | Age: 35
End: 2020-07-21
Payer: COMMERCIAL

## 2020-07-21 DIAGNOSIS — R53.81 DEBILITY: ICD-10-CM

## 2020-07-21 DIAGNOSIS — M54.50 CHRONIC MIDLINE LOW BACK PAIN WITHOUT SCIATICA: ICD-10-CM

## 2020-07-21 DIAGNOSIS — G89.29 CHRONIC MIDLINE LOW BACK PAIN WITHOUT SCIATICA: ICD-10-CM

## 2020-07-21 PROCEDURE — 97110 THERAPEUTIC EXERCISES: CPT | Mod: PN,CQ

## 2020-07-21 PROCEDURE — 97012 MECHANICAL TRACTION THERAPY: CPT | Mod: PN,CQ

## 2020-07-21 NOTE — PROGRESS NOTES
"  Physical Therapy Daily Treatment Note     Name: Hero Quiroz [place]  Clinic Number: [id number]    Therapy Diagnosis:   Encounter Diagnoses   Name Primary?    Chronic midline low back pain without sciatica     Debility      Physician: Devon Amor MD    Visit Date: 7/21/2020  Physician Orders: PT Eval and Treat   Medical Diagnosis from Referral: Chronic midline low back pain without sciatica  Evaluation Date: 6/23/2020  Authorization Period Expiration: 12/31/2020  Plan of Care Expiration: 8/14/2020  Visit # / Visits authorized: 7/20 (8 total)      Time In: 512 p  Time Out: 559 p  Total Billable Time: 47 minutes    Precautions: Standard    Subjective     Pt reports: "grinding" with  in R LB with DKTC and dying bug exercises.  He was compliant with home exercise program.  Response to previous treatment: no complaints   Functional change: no change    Pain: 0/10  Location: bilateral LB region      Objective     Hero received therapeutic exercises to develop strength, flexibility, posture and core stabilization for 32 minutes including:    LTR with PB x 30 B  DKC with PB  SKC x 30 B   Supine PB crunch x 30  Supine PB obliques x 30 B  Hip abd GTB x 30  Bridges w/GTB x 30  Hip add x 30  Stage 2 Dying bug x 20 B  Supine piriformis stretch 3 x 30 sec B NP  +Prone alternating arm/leg x 20 B    Slant board gastroc stretch 3 x 30 sec B  NP  PB: shoulder flexion, abd 3# x 20 each B  +PB: 5-way pull x5     Seated hamstring stretch 3 x 30 sec B NP    Lumbar traction: 60#/25#, 30/10 hold/release x 15 minutes    Home Exercises Provided and Patient Education Provided     Education provided:   - cont HEP    Written Home Exercises Provided: Patient instructed to cont prior HEP.  Exercises were reviewed and Hero was able to demonstrate them prior to the end of the session.  Hero demonstrated good  understanding of the education provided.     See EMR under Patient Instructions for exercises provided prior " "visit.    Assessment     Pt reports "grinding" in R LB "where pain normally is" with DKTC and dying bug exercises. Decreased "grinding" following increased core stabilization throughout exercises. Pt reported increased pain of 3/10, "not much" pain, upon completion of therapy.    Hero is progressing well towards his goals.   Pt prognosis is Good.     Pt will continue to benefit from skilled outpatient physical therapy to address the deficits listed in the problem list box on initial evaluation, provide pt/family education and to maximize pt's level of independence in the home and community environment.     Pt's spiritual, cultural and educational needs considered and pt agreeable to plan of care and goals.    Anticipated barriers to physical therapy: none    Goals:  Short Term Goals (STG) # weeks Goal Review Date Reviewed Date Met   1. The patient will begin a written HEP 1 met 6/30/2020 6/30/2020    2. Increase hamstring flexibility to 55 3 progressing 7/21/2020       3. Decrease soft tissue tenderness to mild 3 progressing 7/21/2020          Long Term Goals (LTG) # weeks Goal Review Date Reviewed Date Met   1. The patient will be independent with his HEP for maintenance 6 progressing 7/21/2020       2. Increase hamstring flexibility to 70* 6 progressing 7/21/2020       3. Decrease FOTO score to 31% 6 progressing 7/21/2020       4. The patient will tolerate standing for 30 minutes without onset of symptoms 6 progressing 7/21/2020             Plan     Cont per POC, strengthening, core    I certify that I was present in the room directing the student in service delivery and guiding them using my skilled judgment. As the co-signing therapist I have reviewed the students documentation and am responsible for the treatment, assessment, and plan.     Ewa Preston, JERSEY Tang, PTA      "

## 2020-07-28 ENCOUNTER — CLINICAL SUPPORT (OUTPATIENT)
Dept: REHABILITATION | Facility: HOSPITAL | Age: 35
End: 2020-07-28
Payer: COMMERCIAL

## 2020-07-28 DIAGNOSIS — M54.50 CHRONIC MIDLINE LOW BACK PAIN WITHOUT SCIATICA: ICD-10-CM

## 2020-07-28 DIAGNOSIS — G89.29 CHRONIC MIDLINE LOW BACK PAIN WITHOUT SCIATICA: ICD-10-CM

## 2020-07-28 DIAGNOSIS — R53.81 DEBILITY: ICD-10-CM

## 2020-07-28 PROCEDURE — 97012 MECHANICAL TRACTION THERAPY: CPT | Mod: PN,CQ

## 2020-07-28 PROCEDURE — 97110 THERAPEUTIC EXERCISES: CPT | Mod: PN,CQ

## 2020-07-28 NOTE — PROGRESS NOTES
"  Physical Therapy Daily Treatment Note     Name: Hero [place]  Clinic Number: [id number]    Therapy Diagnosis:   Encounter Diagnoses   Name Primary?    Chronic midline low back pain without sciatica     Debility      Physician: Devon Amor MD    Visit Date: 7/28/2020  Physician Orders: PT Eval and Treat   Medical Diagnosis from Referral: Chronic midline low back pain without sciatica  Evaluation Date: 6/23/2020  Authorization Period Expiration: 12/31/2020  Plan of Care Expiration: 8/14/2020  Visit # / Visits authorized: 8/20 (9 total)      Time In: 517 p  Time Out: 558 p  Total Billable Time: 41 minutes    Precautions: Standard    Subjective     Pt reports:it "locked up" on him the other day when getting up from kitchen table, it is a 10/10 when that happens, no pain currently  He was compliant with home exercise program.  Response to previous treatment: no complaints   Functional change: no change    Pain: 0/10  Location: bilateral LB region      Objective     Hero received therapeutic exercises to develop strength, flexibility, posture and core stabilization for 26 minutes including:    LTR x 30 B  DKC with PB  SKC x 30 B NP  Supine PB crunch x 30 NP  Supine PB obliques x 30 B NP  Hip abd BTB x 30  Bridges w/BTB x 30  Hip add x 30  Stage 2 Dying bug x 20 B NP  Supine piriformis stretch 3 x 30 sec B NP  +Prone alternating arm/leg x 20 B    Slant board gastroc stretch 3 x 30 sec B  NP  PB: shoulder flexion, abd 3# x 20 each B  +PB: 5-way pull x5, 5 sec    Seated hamstring stretch 3 x 30 sec B NP    Lumbar traction: 60#/25#, 30/10 hold/release x 15 minutes    Home Exercises Provided and Patient Education Provided     Education provided:   - cont HEP    Written Home Exercises Provided: Patient instructed to cont prior HEP.  Exercises were reviewed and Hero was able to demonstrate them prior to the end of the session.  Hero demonstrated good  understanding of the education provided.     See EMR " under Patient Instructions for exercises provided prior visit.    Assessment     Extra time needed for pain to ease from LB at conclusion of traction.  Pain/soreness in LB at conclusion of therapy.    Hero is progressing well towards his goals.   Pt prognosis is Good.     Pt will continue to benefit from skilled outpatient physical therapy to address the deficits listed in the problem list box on initial evaluation, provide pt/family education and to maximize pt's level of independence in the home and community environment.     Pt's spiritual, cultural and educational needs considered and pt agreeable to plan of care and goals.    Anticipated barriers to physical therapy: none    Goals:  Short Term Goals (STG) # weeks Goal Review Date Reviewed Date Met   1. The patient will begin a written HEP 1 met 6/30/2020 6/30/2020    2. Increase hamstring flexibility to 55 3 progressing 7/28/2020       3. Decrease soft tissue tenderness to mild 3 progressing 7/28/2020          Long Term Goals (LTG) # weeks Goal Review Date Reviewed Date Met   1. The patient will be independent with his HEP for maintenance 6 progressing 7/28/2020       2. Increase hamstring flexibility to 70* 6 progressing 7/28/2020       3. Decrease FOTO score to 31% 6 progressing 7/28/2020       4. The patient will tolerate standing for 30 minutes without onset of symptoms 6 progressing 7/28/2020             Plan     Cont per POC, strengthening, core    Ashtyn Tang, PTA

## 2020-07-30 ENCOUNTER — CLINICAL SUPPORT (OUTPATIENT)
Dept: REHABILITATION | Facility: HOSPITAL | Age: 35
End: 2020-07-30
Payer: COMMERCIAL

## 2020-07-30 DIAGNOSIS — R53.81 DEBILITY: ICD-10-CM

## 2020-07-30 DIAGNOSIS — M54.50 CHRONIC MIDLINE LOW BACK PAIN WITHOUT SCIATICA: ICD-10-CM

## 2020-07-30 DIAGNOSIS — G89.29 CHRONIC MIDLINE LOW BACK PAIN WITHOUT SCIATICA: ICD-10-CM

## 2020-07-30 PROCEDURE — 97140 MANUAL THERAPY 1/> REGIONS: CPT | Mod: PN | Performed by: PHYSICAL THERAPIST

## 2020-07-30 PROCEDURE — 97110 THERAPEUTIC EXERCISES: CPT | Mod: PN | Performed by: PHYSICAL THERAPIST

## 2020-07-30 NOTE — PROGRESS NOTES
Physical Therapy Daily Treatment Note     Name: Hero Salmeron  Clinic Number: [id number]    Therapy Diagnosis:   Encounter Diagnoses   Name Primary?    Chronic midline low back pain without sciatica     Debility      Physician: Devno Amor MD    Visit Date: 7/30/2020  Physician Orders: PT Eval and Treat   Medical Diagnosis from Referral: Chronic midline low back pain without sciatica  Evaluation Date: 6/23/2020  Authorization Period Expiration: 12/31/2020  Plan of Care Expiration: 8/14/2020  Visit # / Visits authorized: 8/20 (9 total)      Time In: 517 p  Time Out: 558 p  Total Billable Time: 41 minutes    Precautions: Standard    Subjective     Pt reports:episodes of his back locking up on him. Notes pain with sit to stand transfers  He was compliant with home exercise program.  Response to previous treatment: no complaints   Functional change: no change    Pain: 0/10  Location: bilateral LB region      Objective     Hero received therapeutic exercises to develop strength, flexibility, posture and core stabilization for 37 minutes including:    Supine HSS 10 x 15 sec B  LTR with PB x 30   DKC with PB  Supine PB crunch x 30 NP  Supine PB obliques x 30 B NP  Hip abd BTB x 30  Bridges w/BTB x 30  Hip add x 30  Prone alternating UE/LE lifts 3 x 10      Dry needling was performed to the patient's left lumbar paraspinals L3-L5 with 50 mm needles for 8 minutes. Written consent was obtained prior to the procedure and no increase in pain was the result    Not performed today  Supine piriformis stretch 3 x 30 sec B NP  Slant board gastroc stretch 3 x 30 sec B  NP  PB: shoulder flexion, abd 3# x 20 each B  +PB: 5-way pull x5, 5 sec  Seated hamstring stretch 3 x 30 sec B NP  Lumbar traction: 60#/25#, 30/10 hold/release x 15 minutes    Home Exercises Provided and Patient Education Provided     Education provided:   - cont HEP    Written Home Exercises Provided: Patient instructed to cont prior  HEP.  Exercises were reviewed and Hero was able to demonstrate them prior to the end of the session.  Hero demonstrated good  understanding of the education provided.     See EMR under Patient Instructions for exercises provided prior visit.    Assessment     No adverse effects from dry needling. Patient tolerated treatment well without report of symptoms;    Hero is progressing well towards his goals.   Pt prognosis is Good.     Pt will continue to benefit from skilled outpatient physical therapy to address the deficits listed in the problem list box on initial evaluation, provide pt/family education and to maximize pt's level of independence in the home and community environment.     Pt's spiritual, cultural and educational needs considered and pt agreeable to plan of care and goals.    Anticipated barriers to physical therapy: none    Goals:  Short Term Goals (STG) # weeks Goal Review Date Reviewed Date Met   1. The patient will begin a written HEP 1 met 6/30/2020 6/30/2020    2. Increase hamstring flexibility to 55 3 progressing 7/30/2020       3. Decrease soft tissue tenderness to mild 3 progressing 7/30/2020          Long Term Goals (LTG) # weeks Goal Review Date Reviewed Date Met   1. The patient will be independent with his HEP for maintenance 6 progressing 7/30/2020       2. Increase hamstring flexibility to 70* 6 progressing 7/30/2020       3. Decrease FOTO score to 31% 6 progressing 7/30/2020       4. The patient will tolerate standing for 30 minutes without onset of symptoms 6 progressing 7/30/2020             Plan     Cont per POC, strengthening, core    Floyd Baez, PT

## 2020-08-04 ENCOUNTER — CLINICAL SUPPORT (OUTPATIENT)
Dept: REHABILITATION | Facility: HOSPITAL | Age: 35
End: 2020-08-04
Payer: COMMERCIAL

## 2020-08-04 DIAGNOSIS — R53.81 DEBILITY: ICD-10-CM

## 2020-08-04 DIAGNOSIS — G89.29 CHRONIC MIDLINE LOW BACK PAIN WITHOUT SCIATICA: ICD-10-CM

## 2020-08-04 DIAGNOSIS — M54.50 CHRONIC MIDLINE LOW BACK PAIN WITHOUT SCIATICA: ICD-10-CM

## 2020-08-04 PROCEDURE — 97110 THERAPEUTIC EXERCISES: CPT | Mod: PN,CQ

## 2020-08-06 ENCOUNTER — DOCUMENTATION ONLY (OUTPATIENT)
Dept: REHABILITATION | Facility: HOSPITAL | Age: 35
End: 2020-08-06

## 2020-08-06 NOTE — PROGRESS NOTES
30 day visit PT-PTA face-face discussion with ASHLEY Baez re: patient status, POC, and plan for progression done 8/5/20.  Ashtyn Tang, PTA

## 2020-10-23 ENCOUNTER — PATIENT MESSAGE (OUTPATIENT)
Dept: FAMILY MEDICINE | Facility: CLINIC | Age: 35
End: 2020-10-23

## 2020-11-23 ENCOUNTER — OFFICE VISIT (OUTPATIENT)
Dept: FAMILY MEDICINE | Facility: CLINIC | Age: 35
End: 2020-11-23
Payer: COMMERCIAL

## 2020-11-23 ENCOUNTER — HOSPITAL ENCOUNTER (OUTPATIENT)
Dept: RADIOLOGY | Facility: HOSPITAL | Age: 35
Discharge: HOME | End: 2020-11-23
Attending: FAMILY MEDICINE
Payer: COMMERCIAL

## 2020-11-23 VITALS
HEIGHT: 65 IN | WEIGHT: 170.88 LBS | TEMPERATURE: 98 F | SYSTOLIC BLOOD PRESSURE: 112 MMHG | HEART RATE: 80 BPM | BODY MASS INDEX: 28.47 KG/M2 | OXYGEN SATURATION: 98 % | DIASTOLIC BLOOD PRESSURE: 76 MMHG | RESPIRATION RATE: 16 BRPM

## 2020-11-23 DIAGNOSIS — R05.9 COUGH: ICD-10-CM

## 2020-11-23 DIAGNOSIS — R10.11 RIGHT UPPER QUADRANT PAIN: Primary | ICD-10-CM

## 2020-11-23 DIAGNOSIS — R10.11 RIGHT UPPER QUADRANT PAIN: ICD-10-CM

## 2020-11-23 PROCEDURE — 3008F BODY MASS INDEX DOCD: CPT | Mod: CPTII,S$GLB,, | Performed by: FAMILY MEDICINE

## 2020-11-23 PROCEDURE — 76705 ECHO EXAM OF ABDOMEN: CPT | Mod: 26,,, | Performed by: RADIOLOGY

## 2020-11-23 PROCEDURE — 99213 PR OFFICE/OUTPT VISIT, EST, LEVL III, 20-29 MIN: ICD-10-PCS | Mod: S$GLB,,, | Performed by: FAMILY MEDICINE

## 2020-11-23 PROCEDURE — 76705 US ABDOMEN LIMITED: ICD-10-PCS | Mod: 26,,, | Performed by: RADIOLOGY

## 2020-11-23 PROCEDURE — 99999 PR PBB SHADOW E&M-EST. PATIENT-LVL IV: ICD-10-PCS | Mod: PBBFAC,,, | Performed by: FAMILY MEDICINE

## 2020-11-23 PROCEDURE — 1125F AMNT PAIN NOTED PAIN PRSNT: CPT | Mod: S$GLB,,, | Performed by: FAMILY MEDICINE

## 2020-11-23 PROCEDURE — 99999 PR PBB SHADOW E&M-EST. PATIENT-LVL IV: CPT | Mod: PBBFAC,,, | Performed by: FAMILY MEDICINE

## 2020-11-23 PROCEDURE — 76705 ECHO EXAM OF ABDOMEN: CPT | Mod: TC

## 2020-11-23 PROCEDURE — 99213 OFFICE O/P EST LOW 20 MIN: CPT | Mod: S$GLB,,, | Performed by: FAMILY MEDICINE

## 2020-11-23 PROCEDURE — 1125F PR PAIN SEVERITY QUANTIFIED, PAIN PRESENT: ICD-10-PCS | Mod: S$GLB,,, | Performed by: FAMILY MEDICINE

## 2020-11-23 PROCEDURE — 3008F PR BODY MASS INDEX (BMI) DOCUMENTED: ICD-10-PCS | Mod: CPTII,S$GLB,, | Performed by: FAMILY MEDICINE

## 2020-11-23 RX ORDER — OXYCODONE AND ACETAMINOPHEN 10; 325 MG/1; MG/1
TABLET ORAL
COMMUNITY
Start: 2020-11-09 | End: 2021-12-15

## 2020-11-24 NOTE — PROGRESS NOTES
Subjective:   Patient ID: Hero Salmeron is a 35 y.o. male     Chief Complaint:Abdominal Pain      Pt with RUQ pain going on for past 1-2 weeks. Pain is achey pain. Pain mild. Denies any fever. Denies any change bowel habbits    Abdominal Pain  This is a new problem. The current episode started in the past 7 days. The onset quality is undetermined. The problem occurs constantly. The most recent episode lasted 1 days. The problem has been unchanged. The pain is located in the RUQ. The pain is at a severity of 3/10. The pain is mild. The quality of the pain is dull. The abdominal pain does not radiate. Pertinent negatives include no anorexia, arthralgias, belching, constipation, diarrhea, dysuria, fever, flatus, frequency, headaches, hematochezia, hematuria, melena, myalgias, nausea, vomiting or weight loss. Nothing aggravates the pain. The pain is relieved by nothing. He has tried acetaminophen and antacids for the symptoms. The treatment provided no relief. His past medical history is significant for irritable bowel syndrome. There is no history of abdominal surgery, colon cancer, Crohn's disease, gallstones, GERD, pancreatitis, PUD or ulcerative colitis. Patient's medical history does not include kidney stones and UTI.     Review of Systems   Constitutional: Negative for fever and weight loss.   Gastrointestinal: Positive for abdominal pain. Negative for anorexia, constipation, diarrhea, flatus, hematochezia, melena, nausea and vomiting.   Genitourinary: Negative for dysuria, frequency and hematuria.   Musculoskeletal: Negative for arthralgias and myalgias.   Neurological: Negative for headaches.     Past Medical History:   Diagnosis Date    Psoriasis      History reviewed. No pertinent surgical history.  Objective:     Vitals:    11/23/20 0945   BP: 112/76   Pulse: 80   Resp: 16   Temp: 97.7 °F (36.5 °C)     Body mass index is 28.43 kg/m².  Physical Exam  Vitals signs and nursing note reviewed.    Constitutional:       Appearance: He is well-developed.   HENT:      Head: Normocephalic and atraumatic.   Eyes:      General: No scleral icterus.     Conjunctiva/sclera: Conjunctivae normal.   Neck:      Musculoskeletal: Normal range of motion and neck supple.   Pulmonary:      Effort: Pulmonary effort is normal. No respiratory distress.   Abdominal:      General: There is no distension.      Palpations: Abdomen is soft.      Tenderness: There is no abdominal tenderness. There is no guarding or rebound.   Musculoskeletal: Normal range of motion.         General: No deformity.   Skin:     Coloration: Skin is not pale.      Findings: No rash.   Neurological:      Mental Status: He is alert and oriented to person, place, and time.   Psychiatric:         Behavior: Behavior normal.         Thought Content: Thought content normal.         Judgment: Judgment normal.     no HSM  Assessment:     1. Right upper quadrant pain    2. Cough      Plan:   Right upper quadrant pain  -     US Abdomen Limited; Future; Expected date: 11/23/2020  Counseled patient on possible etiologies. Pt concerned about liver. Advised thing unlikely liver or gallbladder issue but will evaulation with US to rule out. Suspect gas related vs GI issue and may need f/u with GI  Cough  Had been treated for silent reflux. No improvement. Advised will need f/u with ENT or allergist          Time spent with patient: 15 minutes and over half of that time was spent on counseling an coordination of care.    Amador Gonzalez MD  11/24/2020    Portions of this note have been dictated with YULIA Hargrove

## 2021-03-09 ENCOUNTER — IMMUNIZATION (OUTPATIENT)
Dept: PRIMARY CARE CLINIC | Facility: CLINIC | Age: 36
End: 2021-03-09
Payer: COMMERCIAL

## 2021-03-09 DIAGNOSIS — Z23 NEED FOR VACCINATION: Primary | ICD-10-CM

## 2021-03-09 PROCEDURE — 91300 COVID-19, MRNA, LNP-S, PF, 30 MCG/0.3 ML DOSE VACCINE: ICD-10-PCS | Mod: S$GLB,,, | Performed by: FAMILY MEDICINE

## 2021-03-09 PROCEDURE — 0001A COVID-19, MRNA, LNP-S, PF, 30 MCG/0.3 ML DOSE VACCINE: ICD-10-PCS | Mod: CV19,S$GLB,, | Performed by: FAMILY MEDICINE

## 2021-03-09 PROCEDURE — 91300 COVID-19, MRNA, LNP-S, PF, 30 MCG/0.3 ML DOSE VACCINE: CPT | Mod: S$GLB,,, | Performed by: FAMILY MEDICINE

## 2021-03-09 PROCEDURE — 0001A COVID-19, MRNA, LNP-S, PF, 30 MCG/0.3 ML DOSE VACCINE: CPT | Mod: CV19,S$GLB,, | Performed by: FAMILY MEDICINE

## 2021-03-30 ENCOUNTER — IMMUNIZATION (OUTPATIENT)
Dept: PRIMARY CARE CLINIC | Facility: CLINIC | Age: 36
End: 2021-03-30
Payer: COMMERCIAL

## 2021-03-30 DIAGNOSIS — Z23 NEED FOR VACCINATION: Primary | ICD-10-CM

## 2021-03-30 PROCEDURE — 91300 COVID-19, MRNA, LNP-S, PF, 30 MCG/0.3 ML DOSE VACCINE: ICD-10-PCS | Mod: S$GLB,,, | Performed by: FAMILY MEDICINE

## 2021-03-30 PROCEDURE — 0002A COVID-19, MRNA, LNP-S, PF, 30 MCG/0.3 ML DOSE VACCINE: ICD-10-PCS | Mod: CV19,S$GLB,, | Performed by: FAMILY MEDICINE

## 2021-03-30 PROCEDURE — 91300 COVID-19, MRNA, LNP-S, PF, 30 MCG/0.3 ML DOSE VACCINE: CPT | Mod: S$GLB,,, | Performed by: FAMILY MEDICINE

## 2021-03-30 PROCEDURE — 0002A COVID-19, MRNA, LNP-S, PF, 30 MCG/0.3 ML DOSE VACCINE: CPT | Mod: CV19,S$GLB,, | Performed by: FAMILY MEDICINE

## 2021-11-24 ENCOUNTER — IMMUNIZATION (OUTPATIENT)
Dept: PHARMACY | Facility: CLINIC | Age: 36
End: 2021-11-24
Payer: COMMERCIAL

## 2021-11-24 DIAGNOSIS — Z23 NEED FOR VACCINATION: Primary | ICD-10-CM

## 2021-12-15 ENCOUNTER — HOSPITAL ENCOUNTER (OUTPATIENT)
Dept: RADIOLOGY | Facility: CLINIC | Age: 36
Discharge: HOME | End: 2021-12-15
Attending: FAMILY MEDICINE
Payer: COMMERCIAL

## 2021-12-15 ENCOUNTER — OFFICE VISIT (OUTPATIENT)
Dept: FAMILY MEDICINE | Facility: CLINIC | Age: 36
End: 2021-12-15
Payer: COMMERCIAL

## 2021-12-15 VITALS
HEIGHT: 65 IN | WEIGHT: 170.63 LBS | OXYGEN SATURATION: 97 % | BODY MASS INDEX: 28.43 KG/M2 | HEART RATE: 63 BPM | DIASTOLIC BLOOD PRESSURE: 70 MMHG | TEMPERATURE: 98 F | SYSTOLIC BLOOD PRESSURE: 100 MMHG

## 2021-12-15 DIAGNOSIS — R05.3 CHRONIC COUGH: ICD-10-CM

## 2021-12-15 DIAGNOSIS — Z00.00 HEALTHCARE MAINTENANCE: Primary | ICD-10-CM

## 2021-12-15 PROCEDURE — 71046 X-RAY EXAM CHEST 2 VIEWS: CPT | Mod: 26,,, | Performed by: RADIOLOGY

## 2021-12-15 PROCEDURE — 99999 PR PBB SHADOW E&M-EST. PATIENT-LVL IV: ICD-10-PCS | Mod: PBBFAC,,, | Performed by: FAMILY MEDICINE

## 2021-12-15 PROCEDURE — 71046 XR CHEST PA AND LATERAL: ICD-10-PCS | Mod: 26,,, | Performed by: RADIOLOGY

## 2021-12-15 PROCEDURE — 99395 PREV VISIT EST AGE 18-39: CPT | Mod: S$GLB,,, | Performed by: FAMILY MEDICINE

## 2021-12-15 PROCEDURE — 71046 X-RAY EXAM CHEST 2 VIEWS: CPT | Mod: TC,FY,PO

## 2021-12-15 PROCEDURE — 99395 PR PREVENTIVE VISIT,EST,18-39: ICD-10-PCS | Mod: S$GLB,,, | Performed by: FAMILY MEDICINE

## 2021-12-15 PROCEDURE — 99999 PR PBB SHADOW E&M-EST. PATIENT-LVL IV: CPT | Mod: PBBFAC,,, | Performed by: FAMILY MEDICINE

## 2021-12-31 LAB
CHOLEST SERPL-MCNC: 199 MG/DL
CHOLEST/HDLC SERPL: 3.8 (CALC)
HDLC SERPL-MCNC: 52 MG/DL
LDLC SERPL CALC-MCNC: 127 MG/DL (CALC)
NONHDLC SERPL-MCNC: 147 MG/DL (CALC)
TRIGL SERPL-MCNC: 97 MG/DL

## 2022-01-10 ENCOUNTER — PATIENT MESSAGE (OUTPATIENT)
Dept: FAMILY MEDICINE | Facility: CLINIC | Age: 37
End: 2022-01-10
Payer: COMMERCIAL

## 2022-01-12 ENCOUNTER — OFFICE VISIT (OUTPATIENT)
Dept: OTOLARYNGOLOGY | Facility: CLINIC | Age: 37
End: 2022-01-12
Payer: COMMERCIAL

## 2022-01-12 VITALS — TEMPERATURE: 99 F | BODY MASS INDEX: 28.61 KG/M2 | HEIGHT: 65 IN | WEIGHT: 171.75 LBS

## 2022-01-12 DIAGNOSIS — K21.9 LPRD (LARYNGOPHARYNGEAL REFLUX DISEASE): Primary | ICD-10-CM

## 2022-01-12 DIAGNOSIS — R05.3 CHRONIC COUGH: ICD-10-CM

## 2022-01-12 PROCEDURE — 99999 PR PBB SHADOW E&M-EST. PATIENT-LVL III: ICD-10-PCS | Mod: PBBFAC,,, | Performed by: NURSE PRACTITIONER

## 2022-01-12 PROCEDURE — 99999 PR PBB SHADOW E&M-EST. PATIENT-LVL III: CPT | Mod: PBBFAC,,, | Performed by: NURSE PRACTITIONER

## 2022-01-12 PROCEDURE — 1159F MED LIST DOCD IN RCRD: CPT | Mod: CPTII,S$GLB,, | Performed by: NURSE PRACTITIONER

## 2022-01-12 PROCEDURE — 99203 OFFICE O/P NEW LOW 30 MIN: CPT | Mod: 25,S$GLB,, | Performed by: NURSE PRACTITIONER

## 2022-01-12 PROCEDURE — 99203 PR OFFICE/OUTPT VISIT, NEW, LEVL III, 30-44 MIN: ICD-10-PCS | Mod: 25,S$GLB,, | Performed by: NURSE PRACTITIONER

## 2022-01-12 PROCEDURE — 31575 PR LARYNGOSCOPY, FLEXIBLE; DIAGNOSTIC: ICD-10-PCS | Mod: S$GLB,,, | Performed by: NURSE PRACTITIONER

## 2022-01-12 PROCEDURE — 31575 DIAGNOSTIC LARYNGOSCOPY: CPT | Mod: S$GLB,,, | Performed by: NURSE PRACTITIONER

## 2022-01-12 PROCEDURE — 3008F BODY MASS INDEX DOCD: CPT | Mod: CPTII,S$GLB,, | Performed by: NURSE PRACTITIONER

## 2022-01-12 PROCEDURE — 3008F PR BODY MASS INDEX (BMI) DOCUMENTED: ICD-10-PCS | Mod: CPTII,S$GLB,, | Performed by: NURSE PRACTITIONER

## 2022-01-12 PROCEDURE — 1159F PR MEDICATION LIST DOCUMENTED IN MEDICAL RECORD: ICD-10-PCS | Mod: CPTII,S$GLB,, | Performed by: NURSE PRACTITIONER

## 2022-01-12 RX ORDER — OMEPRAZOLE 40 MG/1
40 CAPSULE, DELAYED RELEASE ORAL
Qty: 30 CAPSULE | Refills: 11 | Status: SHIPPED | OUTPATIENT
Start: 2022-01-12 | End: 2022-11-23

## 2022-01-12 RX ORDER — FAMOTIDINE 40 MG/1
40 TABLET, FILM COATED ORAL NIGHTLY
Qty: 30 TABLET | Refills: 11 | Status: SHIPPED | OUTPATIENT
Start: 2022-01-12 | End: 2022-11-23

## 2022-01-12 NOTE — PATIENT INSTRUCTIONS
Coughing is a fundamental protective reflux in response to airway irritation. When cough lasts for more than 8 weeks, it is considered chronic. The protective cough reflex is important for clearing the airway of inhaled particles. Chemical irritants or inflammatory mediators can stimulate the vagus nerve resulting in cough.     Some of the Top Considerations for Chronic Cough:     1. Nasal allergies -- Typical constellation of symptoms seen with nasal allergies: itchy, red, watery eyes; itchy, red, watery nose; excessive sneezing; excessive stuffiness. If this one best describes your current state, then discuss with your primary care provider whether you should see an allergist or take daily allergy medications.     2. Sinus Infection -- Typical constellation of symptoms seen with acute bacterial sinus infection are:  Green-gold, foul-smelling, foul-tasting mucus from nose and throat, inability to breathe through nose, inability to smell or taste well, facial pain and swelling, dental pain, headaches around eyes, sore throat and productive cough. If this one best describes your current state, then let's get sinus imaging to rule out infection.     3.  Silent reflux -- Typical constellation of symptoms seen with silent reflux: post-nasal drip sensation with absence of significant runny nose or nasal congestion, sensation of thick or too much mucus in the back of throat, raspy voice, frequent throat clearing, lump in the back of throat, frequent sore throats. If this one best describes your current state, discuss with your primary care provider whether you should see a gastroenterologist or take daily reflux medications. Your GI doctor may want to do an Upper GI or obtain a barium swallow or pH monitoring test.     4. Irritable Larynx Syndrome -- Otherwise known as Laryngeal Sensory Neuropathy. Typical constellation of symptoms:  a dry persistent cough for months or even years, coughing fits last seconds to minutes  "and sometimes longer, frequent throat clearing, abrupt onset that may follow a viral illness or surgery. Diagnostic tests for asthma, allergy, and reflux are all normal, and patient has not responded to maximal therapy for those conditions. This usually responds to either low-dose Elavil or Gabapentin.     5. Asthma/Pulmonary (Lung) issue -- Typical constellation of symptoms: wheezing, shortness of breath. Discuss with your primary care provider whether you should see a pulmonologist (lung specialist) and have Pulmonary Function Testing (PFTs) done.     6. Certain blood pressure medications known as ACE-inhibitors, such as lisinopril, can cause chronic cough. Though estimates vary in literature, 20% or more of patients taking lisinopril (or other ACE-inhibitor for blood pressure) will develop a chronic dry cough.     7. Post-Viral Cough Syndrome -- Occasionally a cough can persist for 4-8 weeks after an acute upper respiratory viral illness, due to hyperactive sensitivity of the airway nerves. A steroid inhaler and night-time cough suppressant can often help.     8. Pertussis -- Pertussis is a under-recognized cause of persistent cough in adults. A blood test can check for Bordetella.        LARYNGOPHARYNGEAL REFLUX  (SILENT OR ATYPICAL REFLUX)     If you have any of the following symptoms you may have laryngopharyngeal reflux (LPR):  hoarseness, thick or too much mucus, chronic throat pain/irritation, chronic throat clearing, chronic cough, especially cough that wake you up from sleep, chronic "postnasal drip" without the need to blow your nose.      LPR is also called extra esophageal reflux. This means that if you have reflux into the tissues above the esophagus (into the voicebox,) you may experience throat issues as above.      Many people with LPR do not have symptoms of heartburn. Compared to the esophagus, the voice box and the back of the throat are significantly more sensitive to the effects of acid and " "digestive enzymes on surrounding tissue. Acid passing quickly through the esophagus does not have a chance to irritate the area for too long.  However acid that pools in the throat or voice box can cause prolonged irritation resulting in the symptoms of LPR.     The symptoms of LPR can consist of a dry cough and the sensation of something being stuck in the throat.  Some people will complain of heartburn while others may have intermittent hoarseness or loss of voice.  Another major symptom of LPR is "postnasal drip."  Patients are often told symptoms are due to abnormal nasal drainage or sinus infection; however this is rarely the cause of chronic throat irritation. For post nasal drip to cause the complaints described, signs and symptoms of an active nasal infection should be present.      Treatments for LPR include: postural changes (sleeping or laying with an incline), weight reduction, diet modification, medication to reduce stomach acid and promote normal motility, and rarely: surgery to prevent reflux. Most patients will begin to notice some relief in her symptoms about 2-4 weeks after starting the medication; however it is generally recommended the medication should be continued for at least 2 months. If the symptoms completely resolve, the medication can then be tapered.  Some people will remain symptom free while others may have relapses which required treatment again.     Things you may be able to do to prevent reflux.  1. Do not smoke.  Smoking will worsen reflux.    2. Avoid eating at least 2-3 hours prior to bedtime.  Try to avoid very large meals at night.    4. Weight loss.  For patient's with recent weight gain, shedding a few pounds is all that is required to improve reflux.    5. Avoid reflux triggers. These can worsen acid in the stomach or even cause the esophagus muscles to relax: caffeine, soft drinks, acidic foods (tomato, fruit), mints, alcoholic beverages, particularly at night, cheese, " fried foods, spicy foods, eggs, and chocolate.    6. Sleep with the head of bed elevated at least 6 inches.  7. You may also be prescribed a medication, or a medication you take may also be increased. Typically PPIs and H2-blockers or a combination of both are commonly used.  After 4-8 weeks, with significant symptomatic improvement, you may begin weaning your reflux medications down, from prescription strength to over-the-counter strength. Then every other day. Continue to wean as symptoms allow.    See a Gastro doctor (GI) for refractory symptoms and continued management.

## 2022-01-12 NOTE — PROGRESS NOTES
Subjective:       Patient ID: Hero Salmeron is a 36 y.o. male.    Chief Complaint: Sore Throat and Cough    HPI   Patient is new to ENT, referred by Dr. Gonzalez for chronic cough. Chronic cough X 5-6 years, progressively getting worse.    ALLERGY: evaluated by allergist on 2016. Negative allergy testing. Patient takes nothing for allergies. In the past, Zyrtec and Allegra have not helped relieve symptoms. Patient denies significant allergic stigmata:  No itchy, red, watery eyes; no itchy, red, watery nose; no excessive sneezing or stuffiness.   SINUS:  No recent infections or sinus imaging. Patient denies s/s of acute bacterial sinusitis:  No mucopus from nose or throat, no facial swelling/pain, no dental pain, no diminished olfaction/taste, no headaches around the eyes, no sore throat or productive cough.   ACID REFLUX:  No GI specialist or EGD. Patient reports taking Protonix 40 mg for one month back in June 2020 but did not seem to help. (+)Globus sensation and persistent dry cough. Cough worse after eating and upon awakening.    ASTHMA:  Former smoker. Quit last month. No h/o asthma/COPD. Negative CXR last month.   ACE-INHIBITOR: none    Review of Systems   Constitutional: Negative.  Negative for fever.   HENT: Positive for postnasal drip. Negative for nasal congestion, dental problem, facial swelling, rhinorrhea, sinus pressure/congestion, sneezing, sore throat, trouble swallowing and voice change.         Globus sensation (sensation of thick or too much mucus in the back of his throat, sensation of PND w/o anterior rhinorrhea or significant nasosinal symptoms)   Eyes: Negative.  Negative for discharge, redness and itching.   Respiratory: Positive for cough (dry, worse after meals and with laying down). Negative for choking.    Cardiovascular: Negative.    Gastrointestinal: Positive for diarrhea.   Endocrine: Negative.    Genitourinary: Negative.    Musculoskeletal: Negative.    Integumentary:  Negative.    Allergic/Immunologic: Negative.    Neurological: Negative.    Hematological: Negative.    Psychiatric/Behavioral: Negative.          Objective:      Physical Exam  Vitals and nursing note reviewed.   Constitutional:       General: He is not in acute distress.Vital signs are normal.      Appearance: He is well-developed and well-nourished. He is not ill-appearing or diaphoretic.   HENT:      Head: Normocephalic and atraumatic.      Right Ear: Hearing, tympanic membrane, ear canal and external ear normal. No middle ear effusion. Tympanic membrane is not erythematous.      Left Ear: Hearing, tympanic membrane, ear canal and external ear normal.  No middle ear effusion. Tympanic membrane is not erythematous.      Nose: Nose normal. No mucosal edema or rhinorrhea.      Right Sinus: No maxillary sinus tenderness or frontal sinus tenderness.      Left Sinus: No maxillary sinus tenderness or frontal sinus tenderness.      Mouth/Throat:      Mouth: Oropharynx is clear and moist and mucous membranes are normal. Mucous membranes are not pale, not dry and not cyanotic. No oral lesions.      Pharynx: Uvula midline. No oropharyngeal exudate, posterior oropharyngeal edema or posterior oropharyngeal erythema.   Eyes:      General: Lids are normal. No scleral icterus.        Right eye: No discharge.         Left eye: No discharge.      Extraocular Movements: EOM normal.      Conjunctiva/sclera: Conjunctivae normal.      Pupils: Pupils are equal, round, and reactive to light.   Neck:      Thyroid: No thyroid mass or thyromegaly.      Trachea: Trachea normal. No tracheal deviation.   Cardiovascular:      Rate and Rhythm: Normal rate.   Pulmonary:      Effort: Pulmonary effort is normal. No respiratory distress.      Breath sounds: No stridor. No wheezing.   Musculoskeletal:         General: Normal range of motion.      Cervical back: Normal range of motion and neck supple.   Lymphadenopathy:      Head:      Right side of head: No  submental, submandibular, tonsillar, preauricular or posterior auricular adenopathy.      Left side of head: No submental, submandibular, tonsillar, preauricular or posterior auricular adenopathy.      Cervical: No cervical adenopathy.      Right cervical: No superficial or posterior cervical adenopathy.     Left cervical: No superficial or posterior cervical adenopathy.   Skin:     General: Skin is warm, dry and intact.      Coloration: Skin is not pale.      Findings: No lesion or rash.      Nails: There is no cyanosis.   Neurological:      Mental Status: He is alert and oriented to person, place, and time.      Coordination: Coordination normal.      Gait: Gait normal.      Deep Tendon Reflexes: Strength normal.   Psychiatric:         Mood and Affect: Mood and affect normal.         Speech: Speech normal.         Behavior: Behavior normal. Behavior is cooperative.         Thought Content: Thought content normal.         Cognition and Memory: Cognition and memory normal.         Judgment: Judgment normal.       Procedure: Flexible laryngoscopy    In order to fully examine the upper aerodigestive tract, including the larynx, in a patient with a hyperactive gag reflex, and suboptimal visualization with indirect mirror exam,  flexible endoscopy is required.   After explaining the procedure and obtaining verbal consent, a timeout was performed with the patient's participation according to the universal protocol. Both nasal cavities were anesthetized with 4% Xylocaine spray mixed with Jeramy-Synephrine. The flexible laryngoscope  was inserted into the nasal cavity and advanced to visualize the nasal cavity, nasopharynx, the posterior oropharynx, hypopharynx, and the endolarynx with the  findings noted. The scope was removed and the procedure terminated. The patient tolerated this procedure well without apparent complication.     OVERALL FINDINGS  Nasopharynx - the torus is clear. There are no lesions of the posterior  wall.   Oropharynx - no lesions of the tongue base. There is no obvious fullness or asymmetry.  Hypopharynx - there are no lesions of the pyriform sinuses or postcricoid region   Larynx - there are no lesions of the supraglottic or glottic larynx.  Vocal fold mobility is normal.     SPECIFIC FINDINGS  Adenoid tissue - normal   Nasopharynx & eustachian tube orifices - normal   Posterior pharyngeal wall - normal   Base of tongue - normal   Epiglottis - normal   Valleculae - normal   Pyriform sinuses - normal   False vocal cords - normal   True vocal cords - normal  Arytenoids - erythema   Interarytenoid space - erythema, edema       Assessment:       Problem List Items Addressed This Visit    None     Visit Diagnoses     LPRD (laryngopharyngeal reflux disease)    -  Primary    Relevant Medications    omeprazole (PRILOSEC) 40 MG capsule    famotidine (PEPCID) 40 MG tablet    Chronic cough              Plan:     Discussed typical constellation of symptoms seen with some of the more common differentials for chronic cough may include but not limited to: allergic rhinitis (see allergist or take daily allergy meds), silent reflux (see GI or take daily reflux meds), sinusitis (imaging), pulmonary issues (see pulmonologist), ACEi, etc.     Advised/Cautioned: The results of today's ENT exam and flexible endoscopy were detailed to the patient and all questions were answered. Patient education centered around GERD, known exacerbants and contemporary treatment options. Laryngoscope photos were given and reviewed with the patient in detail. Handouts given on LPRD and GERD were given to the patient. After review of these, patient elected to be placed on PPI 40 mg QAM on an empty stomach for the next 6-8 weeks, and H2-blocker QHS. I encouraged the patient once he has completed the evening meal to not snack or consume any other food products or caffeinated beverages for at least  minutes before retiring. Finally, I encouraged  the patient to sleep about 30 degrees above horizontal, and this can be facilitated by using 2-3 pillows or a wedge foam product. If the patient is not demonstrably improved in 6-8 weeks, consultation with gastroenterology may be indicated to rule out intrinsic disease in the lower esophagus, stomach, or proximal duodenum.

## 2022-11-23 ENCOUNTER — OFFICE VISIT (OUTPATIENT)
Dept: FAMILY MEDICINE | Facility: CLINIC | Age: 37
End: 2022-11-23
Payer: COMMERCIAL

## 2022-11-23 VITALS
TEMPERATURE: 98 F | BODY MASS INDEX: 28.11 KG/M2 | HEIGHT: 65 IN | WEIGHT: 168.75 LBS | OXYGEN SATURATION: 97 % | HEART RATE: 75 BPM | DIASTOLIC BLOOD PRESSURE: 68 MMHG | SYSTOLIC BLOOD PRESSURE: 102 MMHG

## 2022-11-23 DIAGNOSIS — L40.50 PSORIATIC ARTHRITIS: ICD-10-CM

## 2022-11-23 DIAGNOSIS — Z00.00 WELLNESS EXAMINATION: Primary | ICD-10-CM

## 2022-11-23 PROCEDURE — 99395 PREV VISIT EST AGE 18-39: CPT | Mod: S$GLB,,, | Performed by: FAMILY MEDICINE

## 2022-11-23 PROCEDURE — 3008F BODY MASS INDEX DOCD: CPT | Mod: CPTII,S$GLB,, | Performed by: FAMILY MEDICINE

## 2022-11-23 PROCEDURE — 3078F DIAST BP <80 MM HG: CPT | Mod: CPTII,S$GLB,, | Performed by: FAMILY MEDICINE

## 2022-11-23 PROCEDURE — 99999 PR PBB SHADOW E&M-EST. PATIENT-LVL III: ICD-10-PCS | Mod: PBBFAC,,, | Performed by: FAMILY MEDICINE

## 2022-11-23 PROCEDURE — 3008F PR BODY MASS INDEX (BMI) DOCUMENTED: ICD-10-PCS | Mod: CPTII,S$GLB,, | Performed by: FAMILY MEDICINE

## 2022-11-23 PROCEDURE — 3074F SYST BP LT 130 MM HG: CPT | Mod: CPTII,S$GLB,, | Performed by: FAMILY MEDICINE

## 2022-11-23 PROCEDURE — 99395 PR PREVENTIVE VISIT,EST,18-39: ICD-10-PCS | Mod: S$GLB,,, | Performed by: FAMILY MEDICINE

## 2022-11-23 PROCEDURE — 3078F PR MOST RECENT DIASTOLIC BLOOD PRESSURE < 80 MM HG: ICD-10-PCS | Mod: CPTII,S$GLB,, | Performed by: FAMILY MEDICINE

## 2022-11-23 PROCEDURE — 99999 PR PBB SHADOW E&M-EST. PATIENT-LVL III: CPT | Mod: PBBFAC,,, | Performed by: FAMILY MEDICINE

## 2022-11-23 PROCEDURE — 3074F PR MOST RECENT SYSTOLIC BLOOD PRESSURE < 130 MM HG: ICD-10-PCS | Mod: CPTII,S$GLB,, | Performed by: FAMILY MEDICINE

## 2022-11-23 NOTE — PATIENT INSTRUCTIONS
Jaime Monahan,     If you are due for any health screening(s) below please notify me so we can arrange them to be ordered and scheduled to maintain your health. Most healthy patients complete it. Don't lose out on improving your health.     All of your core healthy metrics are met.

## 2022-11-25 PROBLEM — L40.50 PSORIATIC ARTHRITIS: Status: ACTIVE | Noted: 2022-11-25

## 2022-11-25 NOTE — PROGRESS NOTES
Subjective:   Patient ID: Hero Salmeron is a 37 y.o. male     Chief Complaint:Annual Exam      Here for checkup    Review of Systems   Constitutional:  Negative for chills and fever.   HENT:  Negative for sore throat and trouble swallowing.    Respiratory:  Negative for cough and shortness of breath.    Cardiovascular:  Negative for chest pain and leg swelling.   Gastrointestinal:  Negative for abdominal distention and abdominal pain.   Genitourinary:  Negative for dysuria and flank pain.   Musculoskeletal:  Negative for arthralgias and back pain.   Skin:  Negative for color change and pallor.   Neurological:  Negative for weakness and headaches.   Psychiatric/Behavioral:  Negative for agitation and confusion.    Past Medical History:   Diagnosis Date    Psoriasis      No past surgical history on file.  Objective:     Vitals:    11/23/22 1459   BP: 102/68   Pulse: 75   Temp: 98.1 °F (36.7 °C)     Body mass index is 28.08 kg/m².  Physical Exam  Vitals and nursing note reviewed.   Constitutional:       Appearance: He is well-developed.   HENT:      Head: Normocephalic and atraumatic.   Eyes:      General: No scleral icterus.     Conjunctiva/sclera: Conjunctivae normal.   Cardiovascular:      Heart sounds: No murmur heard.  Pulmonary:      Effort: Pulmonary effort is normal. No respiratory distress.   Musculoskeletal:         General: No deformity. Normal range of motion.      Cervical back: Normal range of motion and neck supple.   Skin:     Coloration: Skin is not pale.      Findings: No rash.   Neurological:      Mental Status: He is alert and oriented to person, place, and time.   Psychiatric:         Behavior: Behavior normal.         Thought Content: Thought content normal.         Judgment: Judgment normal.     Assessment:     1. Wellness examination    2. Psoriatic arthritis      Plan:   Wellness examination  -     Comprehensive Metabolic Panel; Future; Expected date: 11/23/2022  -     Hemoglobin; Future;  Expected date: 11/23/2022  -     Lipid Panel; Future; Expected date: 11/23/2022  -     Platelet Count; Future; Expected date: 11/23/2022  -     WBC; Future; Expected date: 11/23/2022    Psoriatic arthritis  On tremfya, follows with Dr Roberts    Established patient with me has been instructed that must see me at least 1 time yearly (every 365 days) for refills of medications. Seeing other providers in this clinic is fine but expectation is to see me yearly.    Amador Gonzalez MD  11/25/2022    Portions of this note have been dictated with YULIA Hargrove

## 2022-12-02 ENCOUNTER — LAB VISIT (OUTPATIENT)
Dept: LAB | Facility: HOSPITAL | Age: 37
End: 2022-12-02
Attending: FAMILY MEDICINE
Payer: COMMERCIAL

## 2022-12-02 DIAGNOSIS — Z00.00 WELLNESS EXAMINATION: ICD-10-CM

## 2022-12-02 LAB
ALBUMIN SERPL BCP-MCNC: 4.6 G/DL (ref 3.5–5.2)
ALP SERPL-CCNC: 56 U/L (ref 55–135)
ALT SERPL W/O P-5'-P-CCNC: 17 U/L (ref 10–44)
ANION GAP SERPL CALC-SCNC: 11 MMOL/L (ref 8–16)
AST SERPL-CCNC: 17 U/L (ref 10–40)
BILIRUB SERPL-MCNC: 0.7 MG/DL (ref 0.1–1)
BUN SERPL-MCNC: 22 MG/DL (ref 6–20)
CALCIUM SERPL-MCNC: 10 MG/DL (ref 8.7–10.5)
CHLORIDE SERPL-SCNC: 103 MMOL/L (ref 95–110)
CHOLEST SERPL-MCNC: 269 MG/DL (ref 120–199)
CHOLEST/HDLC SERPL: 8.7 {RATIO} (ref 2–5)
CO2 SERPL-SCNC: 25 MMOL/L (ref 23–29)
CREAT SERPL-MCNC: 1.1 MG/DL (ref 0.5–1.4)
EST. GFR  (NO RACE VARIABLE): >60 ML/MIN/1.73 M^2
GLUCOSE SERPL-MCNC: 95 MG/DL (ref 70–110)
HDLC SERPL-MCNC: 31 MG/DL (ref 40–75)
HDLC SERPL: 11.5 % (ref 20–50)
HGB BLD-MCNC: 15 G/DL (ref 14–18)
LDLC SERPL CALC-MCNC: 205.6 MG/DL (ref 63–159)
NONHDLC SERPL-MCNC: 238 MG/DL
PLATELET # BLD AUTO: 236 K/UL (ref 150–450)
PMV BLD AUTO: 11.4 FL (ref 9.2–12.9)
POTASSIUM SERPL-SCNC: 4.6 MMOL/L (ref 3.5–5.1)
PROT SERPL-MCNC: 7.7 G/DL (ref 6–8.4)
SODIUM SERPL-SCNC: 139 MMOL/L (ref 136–145)
TRIGL SERPL-MCNC: 162 MG/DL (ref 30–150)
WBC # BLD AUTO: 4.63 K/UL (ref 3.9–12.7)

## 2022-12-02 PROCEDURE — 36415 COLL VENOUS BLD VENIPUNCTURE: CPT | Mod: PO | Performed by: FAMILY MEDICINE

## 2022-12-02 PROCEDURE — 80053 COMPREHEN METABOLIC PANEL: CPT | Performed by: FAMILY MEDICINE

## 2022-12-02 PROCEDURE — 85048 AUTOMATED LEUKOCYTE COUNT: CPT | Performed by: FAMILY MEDICINE

## 2022-12-02 PROCEDURE — 85018 HEMOGLOBIN: CPT | Performed by: FAMILY MEDICINE

## 2022-12-02 PROCEDURE — 85049 AUTOMATED PLATELET COUNT: CPT | Performed by: FAMILY MEDICINE

## 2022-12-02 PROCEDURE — 80061 LIPID PANEL: CPT | Performed by: FAMILY MEDICINE

## 2022-12-05 ENCOUNTER — OFFICE VISIT (OUTPATIENT)
Dept: FAMILY MEDICINE | Facility: CLINIC | Age: 37
End: 2022-12-05
Payer: COMMERCIAL

## 2022-12-05 ENCOUNTER — TELEPHONE (OUTPATIENT)
Dept: FAMILY MEDICINE | Facility: CLINIC | Age: 37
End: 2022-12-05

## 2022-12-05 DIAGNOSIS — E78.5 HYPERLIPIDEMIA, UNSPECIFIED HYPERLIPIDEMIA TYPE: Primary | ICD-10-CM

## 2022-12-05 PROCEDURE — 99499 NO LOS: ICD-10-PCS | Mod: 95,,, | Performed by: NURSE PRACTITIONER

## 2022-12-05 PROCEDURE — 99499 UNLISTED E&M SERVICE: CPT | Mod: 95,,, | Performed by: NURSE PRACTITIONER

## 2022-12-05 NOTE — TELEPHONE ENCOUNTER
----- Message from Melody Teixeira sent at 12/5/2022 12:22 PM CST -----  Regarding: concerns  Name of Who is Calling:  KARLOS ANDREW [[id number]]        What is the request in detail: pt has been online a hour waiting on virtual appointment on today please advise          Can the clinic reply by [facility]: no          [facility] [phone] (home)

## 2022-12-05 NOTE — TELEPHONE ENCOUNTER
----- Message from Kaylan More sent at 12/5/2022  1:43 PM CST -----  Regarding: SAME DAY VIRTUAL APPT 11;40AM  Contact: Patient  Type: Patient Call Back         Who called: Patient         What is the request in detail: has a 11:40 virtual visit today; states no one showed up; states a he called and had a msg sent to staff to call him back but has not heard from anyone; please advise           Best call back number: [phone]         Additional Information: Needs to be seen to  discuss cholesterol Rx; attempted to resched with another provider but no one from [place] was avail same day for patient           Thank You

## 2022-12-05 NOTE — TELEPHONE ENCOUNTER
----- Message from Kaylan More sent at 12/5/2022  1:43 PM CST -----  Regarding: SAME DAY VIRTUAL APPT 11;40AM  Contact: Patient  Type: Patient Call Back         Who called: Patient         What is the request in detail: has a 11:40 virtual visit today; states no one showed up; states a he called and had a msg sent to staff to call him back but has not heard from anyone; please advise           Best call back number: [phone]         Additional Information: Needs to be seen to  discuss cholesterol Rx; attempted to resched with another provider but no one from [place] was avail same day for patient           Thank You

## 2022-12-06 ENCOUNTER — OFFICE VISIT (OUTPATIENT)
Dept: FAMILY MEDICINE | Facility: CLINIC | Age: 37
End: 2022-12-06
Payer: COMMERCIAL

## 2022-12-06 DIAGNOSIS — E78.5 HYPERLIPIDEMIA, UNSPECIFIED HYPERLIPIDEMIA TYPE: Primary | ICD-10-CM

## 2022-12-06 PROCEDURE — 99213 OFFICE O/P EST LOW 20 MIN: CPT | Mod: 95,,, | Performed by: PHYSICIAN ASSISTANT

## 2022-12-06 PROCEDURE — 99213 PR OFFICE/OUTPT VISIT, EST, LEVL III, 20-29 MIN: ICD-10-PCS | Mod: 95,,, | Performed by: PHYSICIAN ASSISTANT

## 2022-12-06 NOTE — PROGRESS NOTES
Virtual visit to discuss cholesterol.  Reviewed his cholesterol prior to attempting to initiate the visit.  Staff at call to inform him that he would be waiting for me to join the visit.  At 12:15 p.m. when I joined the visit patient was not there per report of the computer however he states that he was there until 1222.  We attempted to call him back and there was no answer at that time.  Later in the day we were able to reach him and reschedule.  There will be no charge for the visit.

## 2022-12-06 NOTE — TELEPHONE ENCOUNTER
----- Message from Trish Ramirez sent at 12/5/2022  4:03 PM CST -----  Contact: [phone]          Type: Needs Medical Advice  Who Called:  Pt     Best Call Back Number: [phone]    Additional Information: Pt is calling to ask if NP kirit will be able to read his results from blood work. Pt scheduled appt on 12/08.    Pt extremely upset because he stated he has called three times  about Mr Willis not showing up for virtual appt. and no one has called him back.

## 2022-12-06 NOTE — PROGRESS NOTES
Subjective:       Patient ID: Hero Salmeron is a 37 y.o. male.    Chief Complaint: lab     The patient location is: [place]  The chief complaint leading to consultation is: follow up lab     Visit type: audiovisual    Face to Face time with patient: 10  20 minutes of total time spent on the encounter, which includes face to face time and non-face to face time preparing to see the patient (eg, review of tests), Obtaining and/or reviewing separately obtained history, Documenting clinical information in the electronic or other health record, Independently interpreting results (not separately reported) and communicating results to the patient/family/caregiver, or Care coordination (not separately reported).         Each patient to whom he or she provides medical services by telemedicine is:  (1) informed of the relationship between the physician and patient and the respective role of any other health care provider with respect to management of the patient; and (2) notified that he or she may decline to receive medical services by telemedicine and may withdraw from such care at any time.    Notes:   Patient presents to discuss recent lab showing significantly elevated cholesterol.  He had normal lipid panel 1 year ago.  He reports being on high fat keto diet 2 weeks prior to lab draw.  He had since resumed normal health diet and exercise.    Patients patient medical/surgical, social and family histories have been reviewed         Review of Systems    Objective:      Physical Exam  Constitutional:       General: He is not in acute distress.     Appearance: Normal appearance. He is not ill-appearing.   HENT:      Head: Normocephalic and atraumatic.   Eyes:      Conjunctiva/sclera: Conjunctivae normal.   Pulmonary:      Effort: Pulmonary effort is normal.   Neurological:      Mental Status: He is alert.   Psychiatric:         Mood and Affect: Mood normal.       Assessment:       1. Hyperlipidemia, unspecified hyperlipidemia  "type          Plan:       Diagnoses and all orders for this visit:    Hyperlipidemia, unspecified hyperlipidemia type  -   recommend high fiber Mediterranean diet   repeat in 8-12 weeks Lipid Panel; Future                     Documentation entered by me for this encounter may have been done in part using speech-recognition technology. Although I have made an effort to ensure accuracy, "sound like" errors may exist and should be interpreted in context.   This includes face to face time and non-face to face time preparing to see the patient (eg, review of tests), obtaining and/or reviewing separately obtained history, documenting clinical information in the electronic or other health record, independently interpreting results and communicating results to the patient/family/caregiver, or care coordinator.    "

## 2022-12-08 NOTE — TELEPHONE ENCOUNTER
Staff called and told pt that I would arrive late for the visit. When I signed on at 1215 he was not there. I rechecked. We tried to call him on phone no answer.   I proceded with the day. I was later informed that he called someone at 1222 stating he was still on line. Unfortunately this is a technology issue. I had the staff call him later and would have accommodate him same but was unable to reach him.

## 2023-02-04 ENCOUNTER — LAB VISIT (OUTPATIENT)
Dept: LAB | Facility: HOSPITAL | Age: 38
End: 2023-02-04
Attending: PHYSICIAN ASSISTANT
Payer: COMMERCIAL

## 2023-02-04 DIAGNOSIS — E78.5 HYPERLIPIDEMIA, UNSPECIFIED HYPERLIPIDEMIA TYPE: ICD-10-CM

## 2023-02-04 LAB
CHOLEST SERPL-MCNC: 203 MG/DL (ref 120–199)
CHOLEST/HDLC SERPL: 5.3 {RATIO} (ref 2–5)
HDLC SERPL-MCNC: 38 MG/DL (ref 40–75)
HDLC SERPL: 18.7 % (ref 20–50)
LDLC SERPL CALC-MCNC: 141.4 MG/DL (ref 63–159)
NONHDLC SERPL-MCNC: 165 MG/DL
TRIGL SERPL-MCNC: 118 MG/DL (ref 30–150)

## 2023-02-04 PROCEDURE — 36415 COLL VENOUS BLD VENIPUNCTURE: CPT | Mod: PO | Performed by: PHYSICIAN ASSISTANT

## 2023-02-04 PROCEDURE — 80061 LIPID PANEL: CPT | Performed by: PHYSICIAN ASSISTANT

## 2023-11-06 ENCOUNTER — TELEPHONE (OUTPATIENT)
Dept: FAMILY MEDICINE | Facility: CLINIC | Age: 38
End: 2023-11-06
Payer: COMMERCIAL

## 2023-11-06 NOTE — TELEPHONE ENCOUNTER
----- Message from Griselda Burnett, Patient Care Assistant sent at 11/6/2023  2:46 PM CST -----  Regarding: orders  Contact: pt  Type: Needs Medical Advice    Who Called:  pt     Best Call Back Number: [phone] (home)     Additional Information: pt states she would like a callback regarding lab orders. Thanks!

## 2023-11-27 ENCOUNTER — LAB VISIT (OUTPATIENT)
Dept: LAB | Facility: HOSPITAL | Age: 38
End: 2023-11-27
Attending: FAMILY MEDICINE
Payer: COMMERCIAL

## 2023-11-27 ENCOUNTER — OFFICE VISIT (OUTPATIENT)
Dept: FAMILY MEDICINE | Facility: CLINIC | Age: 38
End: 2023-11-27
Payer: COMMERCIAL

## 2023-11-27 VITALS
RESPIRATION RATE: 18 BRPM | SYSTOLIC BLOOD PRESSURE: 130 MMHG | WEIGHT: 163.56 LBS | DIASTOLIC BLOOD PRESSURE: 70 MMHG | TEMPERATURE: 98 F | HEART RATE: 67 BPM | BODY MASS INDEX: 27.25 KG/M2 | OXYGEN SATURATION: 97 % | HEIGHT: 65 IN

## 2023-11-27 DIAGNOSIS — Z00.00 HEALTHCARE MAINTENANCE: ICD-10-CM

## 2023-11-27 DIAGNOSIS — Z00.00 HEALTHCARE MAINTENANCE: Primary | ICD-10-CM

## 2023-11-27 PROCEDURE — 3075F SYST BP GE 130 - 139MM HG: CPT | Mod: CPTII,S$GLB,, | Performed by: FAMILY MEDICINE

## 2023-11-27 PROCEDURE — 80053 COMPREHEN METABOLIC PANEL: CPT | Performed by: FAMILY MEDICINE

## 2023-11-27 PROCEDURE — 3008F BODY MASS INDEX DOCD: CPT | Mod: CPTII,S$GLB,, | Performed by: FAMILY MEDICINE

## 2023-11-27 PROCEDURE — 99999 PR PBB SHADOW E&M-EST. PATIENT-LVL III: ICD-10-PCS | Mod: PBBFAC,,, | Performed by: FAMILY MEDICINE

## 2023-11-27 PROCEDURE — 85048 AUTOMATED LEUKOCYTE COUNT: CPT | Performed by: FAMILY MEDICINE

## 2023-11-27 PROCEDURE — 99999 PR PBB SHADOW E&M-EST. PATIENT-LVL III: CPT | Mod: PBBFAC,,, | Performed by: FAMILY MEDICINE

## 2023-11-27 PROCEDURE — 85049 AUTOMATED PLATELET COUNT: CPT | Performed by: FAMILY MEDICINE

## 2023-11-27 PROCEDURE — 3075F PR MOST RECENT SYSTOLIC BLOOD PRESS GE 130-139MM HG: ICD-10-PCS | Mod: CPTII,S$GLB,, | Performed by: FAMILY MEDICINE

## 2023-11-27 PROCEDURE — 99395 PREV VISIT EST AGE 18-39: CPT | Mod: S$GLB,,, | Performed by: FAMILY MEDICINE

## 2023-11-27 PROCEDURE — 1159F PR MEDICATION LIST DOCUMENTED IN MEDICAL RECORD: ICD-10-PCS | Mod: CPTII,S$GLB,, | Performed by: FAMILY MEDICINE

## 2023-11-27 PROCEDURE — 3078F PR MOST RECENT DIASTOLIC BLOOD PRESSURE < 80 MM HG: ICD-10-PCS | Mod: CPTII,S$GLB,, | Performed by: FAMILY MEDICINE

## 2023-11-27 PROCEDURE — 36415 COLL VENOUS BLD VENIPUNCTURE: CPT | Mod: PO | Performed by: FAMILY MEDICINE

## 2023-11-27 PROCEDURE — 1159F MED LIST DOCD IN RCRD: CPT | Mod: CPTII,S$GLB,, | Performed by: FAMILY MEDICINE

## 2023-11-27 PROCEDURE — 99395 PR PREVENTIVE VISIT,EST,18-39: ICD-10-PCS | Mod: S$GLB,,, | Performed by: FAMILY MEDICINE

## 2023-11-27 PROCEDURE — 85018 HEMOGLOBIN: CPT | Performed by: FAMILY MEDICINE

## 2023-11-27 PROCEDURE — 3008F PR BODY MASS INDEX (BMI) DOCUMENTED: ICD-10-PCS | Mod: CPTII,S$GLB,, | Performed by: FAMILY MEDICINE

## 2023-11-27 PROCEDURE — 3078F DIAST BP <80 MM HG: CPT | Mod: CPTII,S$GLB,, | Performed by: FAMILY MEDICINE

## 2023-11-27 PROCEDURE — 80061 LIPID PANEL: CPT | Performed by: FAMILY MEDICINE

## 2023-11-27 NOTE — PROGRESS NOTES
Subjective:   Patient ID: Hero Salmeron is a 38 y.o. male     Chief Complaint:Annual Exam      Here for checkup      Review of Systems   Constitutional:  Negative for chills and fever.   HENT:  Negative for sore throat and trouble swallowing.    Respiratory:  Negative for cough and shortness of breath.    Cardiovascular:  Negative for chest pain and leg swelling.   Gastrointestinal:  Negative for abdominal distention and abdominal pain.   Genitourinary:  Negative for dysuria and flank pain.   Musculoskeletal:  Negative for arthralgias and back pain.   Skin:  Negative for color change and pallor.   Neurological:  Negative for weakness and headaches.   Psychiatric/Behavioral:  Negative for agitation and confusion.      Past Medical History:   Diagnosis Date    Psoriasis      History reviewed. No pertinent surgical history.  Objective:     Vitals:    11/27/23 1501   BP: 130/70   Pulse: 67   Resp: 18   Temp: 98.2 °F (36.8 °C)     Body mass index is 27.22 kg/m².  Physical Exam  Vitals and nursing note reviewed.   Constitutional:       Appearance: He is well-developed.   HENT:      Head: Normocephalic and atraumatic.   Eyes:      General: No scleral icterus.     Conjunctiva/sclera: Conjunctivae normal.   Cardiovascular:      Heart sounds: No murmur heard.  Pulmonary:      Effort: Pulmonary effort is normal. No respiratory distress.   Musculoskeletal:         General: No deformity. Normal range of motion.      Cervical back: Normal range of motion and neck supple.   Skin:     Coloration: Skin is not pale.      Findings: No rash.   Neurological:      Mental Status: He is alert and oriented to person, place, and time.   Psychiatric:         Behavior: Behavior normal.         Thought Content: Thought content normal.         Judgment: Judgment normal.       Assessment:     1. Healthcare maintenance      Plan:   Healthcare maintenance  -     LIPID PANEL; Future; Expected date: 11/27/2023  -     WBC; Future; Expected date:  11/27/2023  -     PLATELET COUNT; Future; Expected date: 11/27/2023  -     HEMOGLOBIN; Future; Expected date: 11/27/2023  -     COMPREHENSIVE METABOLIC PANEL; Future; Expected date: 11/27/2023          Established patient with me has been instructed that must see me at least 1 time yearly (every 365 days) for refills of medications. Seeing other providers in this clinic is fine but expectation is to see me yearly.    Amador Gonzalez MD  11/27/2023    Portions of this note have been dictated with YULIA Hargrove

## 2023-11-28 LAB
ALBUMIN SERPL BCP-MCNC: 4.5 G/DL (ref 3.5–5.2)
ALP SERPL-CCNC: 68 U/L (ref 55–135)
ALT SERPL W/O P-5'-P-CCNC: 20 U/L (ref 10–44)
ANION GAP SERPL CALC-SCNC: 10 MMOL/L (ref 8–16)
AST SERPL-CCNC: 25 U/L (ref 10–40)
BILIRUB SERPL-MCNC: 0.5 MG/DL (ref 0.1–1)
BUN SERPL-MCNC: 16 MG/DL (ref 6–20)
CALCIUM SERPL-MCNC: 9.6 MG/DL (ref 8.7–10.5)
CHLORIDE SERPL-SCNC: 103 MMOL/L (ref 95–110)
CHOLEST SERPL-MCNC: 211 MG/DL (ref 120–199)
CHOLEST/HDLC SERPL: 3.6 {RATIO} (ref 2–5)
CO2 SERPL-SCNC: 29 MMOL/L (ref 23–29)
CREAT SERPL-MCNC: 1 MG/DL (ref 0.5–1.4)
EST. GFR  (NO RACE VARIABLE): >60 ML/MIN/1.73 M^2
GLUCOSE SERPL-MCNC: 90 MG/DL (ref 70–110)
HDLC SERPL-MCNC: 58 MG/DL (ref 40–75)
HDLC SERPL: 27.5 % (ref 20–50)
HGB BLD-MCNC: 14 G/DL (ref 14–18)
LDLC SERPL CALC-MCNC: 143.4 MG/DL (ref 63–159)
NONHDLC SERPL-MCNC: 153 MG/DL
PLATELET # BLD AUTO: 213 K/UL (ref 150–450)
PMV BLD AUTO: 11.3 FL (ref 9.2–12.9)
POTASSIUM SERPL-SCNC: 4 MMOL/L (ref 3.5–5.1)
PROT SERPL-MCNC: 7.5 G/DL (ref 6–8.4)
SODIUM SERPL-SCNC: 142 MMOL/L (ref 136–145)
TRIGL SERPL-MCNC: 48 MG/DL (ref 30–150)
WBC # BLD AUTO: 7.04 K/UL (ref 3.9–12.7)

## 2025-08-20 ENCOUNTER — PATIENT MESSAGE (OUTPATIENT)
Dept: ADMINISTRATIVE | Facility: HOSPITAL | Age: 40
End: 2025-08-20
Payer: COMMERCIAL